# Patient Record
Sex: FEMALE | Employment: UNEMPLOYED | URBAN - METROPOLITAN AREA
[De-identification: names, ages, dates, MRNs, and addresses within clinical notes are randomized per-mention and may not be internally consistent; named-entity substitution may affect disease eponyms.]

---

## 2017-01-05 ENCOUNTER — HOSPITAL ENCOUNTER (OUTPATIENT)
Dept: MRI IMAGING | Facility: CLINIC | Age: 47
Discharge: HOME | End: 2017-01-05
Attending: PAIN MEDICINE | Admitting: PAIN MEDICINE
Payer: COMMERCIAL

## 2017-01-05 ENCOUNTER — TELEPHONE (OUTPATIENT)
Dept: PALLIATIVE MEDICINE | Facility: CLINIC | Age: 47
End: 2017-01-05

## 2017-01-05 DIAGNOSIS — M54.2 CERVICALGIA: ICD-10-CM

## 2017-01-05 DIAGNOSIS — G89.29 CHRONIC LOW BACK PAIN WITHOUT SCIATICA, UNSPECIFIED BACK PAIN LATERALITY: Primary | ICD-10-CM

## 2017-01-05 DIAGNOSIS — M54.50 CHRONIC LOW BACK PAIN WITHOUT SCIATICA, UNSPECIFIED BACK PAIN LATERALITY: Primary | ICD-10-CM

## 2017-01-05 PROCEDURE — 72141 MRI NECK SPINE W/O DYE: CPT
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
## 2017-01-10 NOTE — TELEPHONE ENCOUNTER
"01/05/17 MRI cervical spine completed/reviewed. Per radiology, this demonstrates moderate degenerative changes of the cervical spine, most pronounced at the C5-6 and C6-7 level, with moderate spinal canal narrowing and flattening of the cord. 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     Per [place] , Oxycodone-Acetaminophen 5-325 mg per Dr. Deja Cha, #68 tabs filled on 10/10/16, #56 tabs filed on 10/25/16. Per clinic visit on 11/23/16: \"She reports taking between 2-3 tabs per day. She denies running short on her medications, although states that she doesn't have any medications left right now.\" 11/23/16 urine drug screen was negative for opioids:    ====================================================================   TOXASSURE COMP DRUG ANALYSIS,UR   ====================================================================   Test                             Result       Flag       Units     Drug Present    Amitriptyline                  PRESENT    Nortriptyline                  PRESENT     Nortriptyline may be administered as a prescription drug; it is     also an expected metabolite of amitriptyline.      Bupropion                      PRESENT    Quetiapine                     PRESENT   ====================================================================   Test                      Result    Flag   Units      Ref Range    Creatinine              111              mg/dL      >=20   ====================================================================   Declared Medications:   Medication list was not provided.   ====================================================================   For clinical consultation, please call [phone].   ====================================================================    Of note, subsequent urine drug screen performed by PCP clinic on 12/26/16 was positive for methamphetamine and cannabinoids. Commentary notes: \"Zantac may cause a false positive\" for " methamphetamines. Confirmatory screen on 12/26/16 was negative for cannabinoids. There is no confirmatory screen available for methamphetamines, but this would certainly be recommended in the future to clarify any concerns.     While opioid prescribing is ultimately at the discretion of the patient's primary care provider (who has longer standing history with the patient), would find it difficult to recommend ongoing opioid treatment based on historical inconsistencies with drug screening results. Opioids will not be provided through the pain clinic.     Patient is scheduled for follow up on 01/11/17, at which time we will discuss imaging and lab results, as well as next steps.     CC: Dr. Deja Cha - curtis Conde MD  [facility]

## 2017-01-18 ENCOUNTER — TELEPHONE (OUTPATIENT)
Dept: FAMILY MEDICINE | Facility: CLINIC | Age: 47
End: 2017-01-18

## 2017-01-18 NOTE — TELEPHONE ENCOUNTER
Incoming call from pt requesting pain meds to help manage back pain. Per pt, she has severe back pain.       Thank you,     Carleen Garcia     [facility]

## 2017-01-18 NOTE — TELEPHONE ENCOUNTER
Controlled Substance Refill Request for Percocet    Last refill: 12/26/16, 40 tablets    Last clinic visit: 12/26/16     Next appt:    Controlled substance agreement on file: No.    Documentation in problem list reviewed:      Processing:      [place] reviewed:  reviewed- no concerns  [place]  [email or web address]

## 2017-01-19 ENCOUNTER — TRANSFERRED RECORDS (OUTPATIENT)
Dept: HEALTH INFORMATION MANAGEMENT | Facility: CLINIC | Age: 47
End: 2017-01-19

## 2017-01-19 ENCOUNTER — OFFICE VISIT (OUTPATIENT)
Dept: FAMILY MEDICINE | Facility: CLINIC | Age: 47
End: 2017-01-19
Payer: COMMERCIAL

## 2017-01-19 VITALS
TEMPERATURE: 99.1 F | BODY MASS INDEX: 32.38 KG/M2 | RESPIRATION RATE: 16 BRPM | OXYGEN SATURATION: 97 % | DIASTOLIC BLOOD PRESSURE: 94 MMHG | WEIGHT: 171.5 LBS | SYSTOLIC BLOOD PRESSURE: 123 MMHG | HEART RATE: 105 BPM | HEIGHT: 61 IN

## 2017-01-19 DIAGNOSIS — F43.23 ADJUSTMENT DISORDER WITH MIXED ANXIETY AND DEPRESSED MOOD: ICD-10-CM

## 2017-01-19 DIAGNOSIS — G89.29 CHRONIC RIGHT-SIDED LOW BACK PAIN WITHOUT SCIATICA: ICD-10-CM

## 2017-01-19 DIAGNOSIS — E78.5 HYPERLIPIDEMIA LDL GOAL <160: ICD-10-CM

## 2017-01-19 DIAGNOSIS — M54.50 CHRONIC RIGHT-SIDED LOW BACK PAIN WITHOUT SCIATICA: ICD-10-CM

## 2017-01-19 DIAGNOSIS — G89.29 OTHER CHRONIC PAIN: Primary | ICD-10-CM

## 2017-01-19 LAB
AMPHETAMINES UR QL: ABNORMAL
BARBITURATES UR QL: ABNORMAL
BENZODIAZ UR QL: ABNORMAL
CANNABINOIDS UR QL: ABNORMAL
COCAINE UR QL: ABNORMAL
MDA UR QL SCN: ABNORMAL
METHADONE UR QL SCN: ABNORMAL
METHAMPHET UR QL SCN: ABNORMAL
OPIATES UR QL SCN: ABNORMAL
OXYCODONE UR QL: ABNORMAL
PCP UR QL SCN: ABNORMAL
TRICYCLICS UR QL SCN: ABNORMAL

## 2017-01-19 PROCEDURE — 80305 DRUG TEST PRSMV DIR OPT OBS: CPT | Mod: 59 | Performed by: FAMILY MEDICINE

## 2017-01-19 PROCEDURE — 80307 DRUG TEST PRSMV CHEM ANLYZR: CPT | Mod: 90 | Performed by: FAMILY MEDICINE

## 2017-01-19 PROCEDURE — 99000 SPECIMEN HANDLING OFFICE-LAB: CPT | Performed by: FAMILY MEDICINE

## 2017-01-19 PROCEDURE — 99214 OFFICE O/P EST MOD 30 MIN: CPT | Performed by: FAMILY MEDICINE

## 2017-01-19 RX ORDER — OXYCODONE AND ACETAMINOPHEN 5; 325 MG/1; MG/1
1 TABLET ORAL EVERY 6 HOURS PRN
Qty: 56 TABLET | Refills: 0 | Status: SHIPPED | OUTPATIENT
Start: 2017-01-19 | End: 2017-01-19

## 2017-01-19 NOTE — PATIENT INSTRUCTIONS
Reschedule with pain clinic  No narcotics today per their recommendation   your lumbar back brace  Find someone to go with you to the lumber injection and then we can set that up again  DME for TENS unit    Gene sight testing today     F/u with Dr LOW one month    We reviewed your cervical MRI    Ketoprofen topical ordered at [facility]  [address] [place] [address]  Tel: [phone]  Fax: [phone]  Clinic Hours:  Wednesdays, 6:00 p.m. - 8:00 p.m.  Saturdays, 9:00 a.m. - noon  New patients must go in during these hours and fill out paperwork to set up an intake. They will then schedule appointments.  Chiropractic, acupuncture and [place] medicine, massage therapy, psychology, medicine, nursing, yoga and other therapies. Provided by students under supervision.  Free and open to all.

## 2017-01-19 NOTE — MR AVS SNAPSHOT
After Visit Summary   1/19/2017    Phil Mckoy    MRN: [id number]           Patient Information     Date Of Birth          1970        Visit Information        Provider Department      1/19/2017 4:00 PM Deja Cha MD [facility]        Today's Diagnoses     Other chronic pain    -  1     Chronic right-sided low back pain without sciatica         Hyperlipidemia LDL goal <160         Adjustment disorder with mixed anxiety and depressed mood         BMI 32.0-32.9,adult           Care Instructions    Reschedule with pain clinic  No narcotics today per their recommendation   your lumbar back brace  Find someone to go with you to the lumber injection and then we can set that up again  DME for TENS unit    Gene sight testing today     F/u with Dr LOW one month    We reviewed your cervical MRI    Ketoprofen topical ordered at [facility]  [address] [place] [address]  Tel: [phone]  Fax: [phone]  Clinic Hours:  Wednesdays, 6:00 p.m. - 8:00 p.m.  Saturdays, 9:00 a.m. - noon  New patients must go in during these hours and fill out paperwork to set up an intake. They will then schedule appointments.  Chiropractic, acupuncture and [place] medicine, massage therapy, psychology, medicine, nursing, yoga and other therapies. Provided by students under supervision.  Free and open to all.            Follow-ups after your visit        Your next 10 appointments already scheduled     Feb 27, 2017  1:00 PM   Return Visit with Deja Cha MD   [facility] ([facility])    [address]  [address]  [facility] [address] [phone]              Future tests that were ordered for you today     Open Standing Orders        Priority Remaining Interval Expires Ordered    Cannabinoids quantitative confirm urine Routine 6/6 1/19/2018  "2017            Who to contact     If you have questions or need follow up information about today's clinic visit or your schedule please contact [facility] directly at [phone].  Normal or non-critical lab and imaging results will be communicated to you by MyChart, letter or phone within 4 business days after the clinic has received the results. If you do not hear from us within 7 days, please contact the clinic through MyChart or phone. If you have a critical or abnormal lab result, we will notify you by phone as soon as possible.  Submit refill requests through [employer] or call your pharmacy and they will forward the refill request to us. Please allow 3 business days for your refill to be completed.          Additional Information About Your Visit        [employer] Information     [employer] lets you send messages to your doctor, view your test results, renew your prescriptions, schedule appointments and more. To sign up, go to [email or web address] . Click on \"Log in\" on the left side of the screen, which will take you to the Welcome page. Then click on \"Sign up Now\" on the right side of the page.     You will be asked to enter the access code listed below, as well as some personal information. Please follow the directions to create your username and password.     Your access code is: [email or web address]  Expires: 2017  5:03 PM     Your access code will  in 90 days. If you need help or a new code, please call your [place] clinic or [phone].        Care EveryWhere ID     This is your Care EveryWhere ID. This could be used by other organizations to access your [place] medical records  [id number]        Your Vitals Were     Pulse Temperature Respirations Height BMI (Body Mass Index) Pulse Oximetry    105 99.1  F (37.3  C) (Oral) 16 5' 1\" (1.549 m) 32.42 kg/m2 97%       Blood Pressure from Last 3 Encounters:   17 123/94   16 154/91   16 157/105    " Weight from Last 3 Encounters:   01/19/17 171 lb 8 oz (77.792 kg)   12/26/16 170 lb (77.111 kg)   11/23/16 165 lb (74.844 kg)              We Performed the Following     Drug abuse screen (NL, RW)     Pain Drug Scr UR W Rptd Meds          Today's Medication Changes          These changes are accurate as of: 1/19/17  5:03 PM.  If you have any questions, ask your nurse or doctor.               Start taking these medicines.        Dose/Directions    ketoprofen 10% in PLO 10% topical gel   Used for:  Other chronic pain, Chronic right-sided low back pain without sciatica   Started by:  Deja Cha MD        Dose:  1 g   Apply 1 g topically every 4 hours as needed for moderate pain   Quantity:  30 g   Refills:  3         These medicines have changed or have updated prescriptions.        Dose/Directions    * order for DME   This may have changed:  Another medication with the same name was added. Make sure you understand how and when to take each.   Used for:  Chronic low back pain without sciatica, unspecified back pain laterality   Changed by:  Deja Cha MD        Equipment being ordered: Lumbar back brace   Quantity:  1 Device   Refills:  0       * order for DME   This may have changed:  You were already taking a medication with the same name, and this prescription was added. Make sure you understand how and when to take each.   Used for:  Other chronic pain, Chronic right-sided low back pain without sciatica   Changed by:  Deja Cha MD        Equipment being ordered: TENS   Quantity:  1 Device   Refills:  0       * Notice:  This list has 2 medication(s) that are the same as other medications prescribed for you. Read the directions carefully, and ask your doctor or other care provider to review them with you.      Stop taking these medicines if you haven't already. Please contact your care team if you have questions.     oxyCODONE-acetaminophen 5-325 MG per tablet   Commonly known as:   PERCOCET   Stopped by:  Deja Cha MD                Where to get your medicines      These medications were sent to [facility] [place], [place] - [address]  [address], [facility] [address]     Phone:  [phone]    - ketoprofen 10% in PLO 10% topical gel      Some of these will need a paper prescription and others can be bought over the counter.  Ask your nurse if you have questions.     Bring a paper prescription for each of these medications    - order for DME             Primary Care Provider Office Phone # Fax #    Deja Cha [phone]        [facility] [address]  [facility] [place] [address]        Thank you!     Thank you for choosing [facility]  for your care. Our goal is always to provide you with excellent care. Hearing back from our patients is one way we can continue to improve our services. Please take a few minutes to complete the written survey that you may receive in the mail after your visit with us. Thank you!             Your Updated Medication List - Protect others around you: Learn how to safely use, store and throw away your medicines at www.disposemymeds.org.          This list is accurate as of: 1/19/17  5:03 PM.  Always use your most recent med list.                   Brand Name Dispense Instructions for use    albuterol 108 (90 BASE) MCG/ACT Inhaler    PROAIR HFA/PROVENTIL HFA/VENTOLIN HFA    1 Inhaler    Inhale 2 puffs into the lungs every 6 hours as needed for shortness of breath / dyspnea       amitriptyline 25 MG tablet    ELAVIL    90 tablet    Take 3 tablets (75 mg) by mouth At Bedtime       buPROPion 150 MG 12 hr tablet    WELLBUTRIN SR    180 tablet    Take 1 tablet (150 mg) by mouth 2 times daily       calcium carbonate 500 MG chewable tablet    TUMS    100 tablet    Take 1 tablet (500 mg) by mouth 2 times daily       fexofenadine 180 MG tablet    ALLEGRA    30  tablet    Take 1 tablet (180 mg) by mouth nightly as needed for allergies       fluticasone 50 MCG/ACT spray    FLONASE    16 g    Spray 1-2 sprays into both nostrils daily       ketoprofen 10% in PLO 10% topical gel     30 g    Apply 1 g topically every 4 hours as needed for moderate pain       loratadine 10 MG tablet    CLARITIN         methylcellulose 500 MG Tabs tablet    CITRUCEL    30 each    Take 1 tablet (500 mg) by mouth daily       mometasone-formoterol 100-5 MCG/ACT oral inhaler    DULERA    1 Inhaler    Inhale 2 puffs into the lungs 2 times daily       omeprazole 40 MG capsule    priLOSEC    60 capsule    Take 1 capsule (40 mg) by mouth 2 times daily Take 30-60 minutes before a meal.       * order for DME     1 Device    Equipment being ordered: Lumbar back brace       * order for DME     1 Device    Equipment being ordered: TENS       polyethylene glycol powder    MIRALAX    510 g    Take 17 g (1 capful) by mouth daily       prazosin 2 MG capsule    MINIPRESS    45 capsule    Take 1 capsule (2 mg) by mouth At Bedtime       * QUEtiapine 25 MG tablet    SEROQUEL    30 tablet    Take 0.5 tablets (12.5 mg) by mouth 4 times daily as needed For hallucinations and anger and irritability and anxiety       * QUEtiapine 300 MG tablet    SEROquel    90 tablet    Take 1 tablet (300 mg) by mouth At Bedtime       ranitidine 300 MG tablet    ZANTAC    30 tablet    Take 1 tablet (300 mg) by mouth At Bedtime       SUMAtriptan 100 MG tablet    IMITREX    18 tablet    Take 1 tablet (100 mg) by mouth at onset of headache for migraine May repeat dose in 2 hours.  Do not exceed 200 mg in 24 hours       * Notice:  This list has 4 medication(s) that are the same as other medications prescribed for you. Read the directions carefully, and ask your doctor or other care provider to review them with you.
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
## 2017-01-20 ENCOUNTER — TELEPHONE (OUTPATIENT)
Dept: FAMILY MEDICINE | Facility: CLINIC | Age: 47
End: 2017-01-20

## 2017-01-20 NOTE — TELEPHONE ENCOUNTER
Writer was asked to complete LettuceThinneright testing on Pt.  Bucal swab obtained, tests ordered and sent into [employer].      Neftaly Flores RN

## 2017-01-20 NOTE — PROGRESS NOTES
Spiralcat testing completed.  Writer ordered and sent.  Will provide results to Dr. Cha when obtained.    Neftaly Flores RN

## 2017-01-24 ENCOUNTER — MEDICAL CORRESPONDENCE (OUTPATIENT)
Dept: HEALTH INFORMATION MANAGEMENT | Facility: CLINIC | Age: 47
End: 2017-01-24

## 2017-01-24 LAB — PAIN DRUG SCR UR W RPTD MEDS: NORMAL

## 2017-01-26 ENCOUNTER — TELEPHONE (OUTPATIENT)
Dept: FAMILY MEDICINE | Facility: CLINIC | Age: 47
End: 2017-01-26

## 2017-01-26 ENCOUNTER — OFFICE VISIT (OUTPATIENT)
Dept: FAMILY MEDICINE | Facility: CLINIC | Age: 47
End: 2017-01-26
Payer: COMMERCIAL

## 2017-01-26 DIAGNOSIS — R44.1 HALLUCINATIONS, VISUAL: ICD-10-CM

## 2017-01-26 DIAGNOSIS — F51.5 NIGHTMARES: ICD-10-CM

## 2017-01-26 DIAGNOSIS — F33.1 MAJOR DEPRESSIVE DISORDER, RECURRENT EPISODE, MODERATE (H): Primary | ICD-10-CM

## 2017-01-26 DIAGNOSIS — T30.0 BURN: Primary | ICD-10-CM

## 2017-01-26 PROCEDURE — 99211 OFF/OP EST MAY X REQ PHY/QHP: CPT

## 2017-01-26 NOTE — TELEPHONE ENCOUNTER
I reviewed this and will go over it with the patient at her next appointment. Original in Dr T upright file.Still needs to get copied and scanned to chart and copy sent to patient.  MA can you copy this and place the original back in my file please.

## 2017-01-26 NOTE — TELEPHONE ENCOUNTER
This patients insurance Requires a Prior Authorization for Prazosin 2mg .    Message from Plan Product/service not covered     Plan name [employer]  Contact number [phone]    Member ID [id number]  Pharmacy NPI [id number]  Product NDC [address]-27  Quantity/Day Supply 30/30  RX # [id number]    We would appreciate any updates you may receive as this process develops.    Thank you!    Marjorie Vazquez   Pharmacy Technician     [facility]   [address]  [facility]. [address] [place] [address]. [address]    Retail line: [phone]  Retail Fax: [phone]    Discharge line:[phone]  Discharge Fax: [phone]
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
## 2017-01-27 ENCOUNTER — TELEPHONE (OUTPATIENT)
Dept: FAMILY MEDICINE | Facility: CLINIC | Age: 47
End: 2017-01-27

## 2017-01-27 RX ORDER — PRAZOSIN HYDROCHLORIDE 2 MG/1
2 CAPSULE ORAL AT BEDTIME
Qty: 30 CAPSULE | Refills: 2 | Status: SHIPPED | OUTPATIENT
Start: 2017-01-27 | End: 2018-05-02

## 2017-01-27 NOTE — TELEPHONE ENCOUNTER
Incoming call from pt requesting a letter from PCP stating that she can ride public transportation with her dog.     Carleen Garcia

## 2017-01-27 NOTE — Clinical Note
February 2, 2017      Albertodee MEJIA Ean  [address] [facility] [address] [place] [id number]            To Whom It May Concern,    This patient follows at our clinic for her medical care.     Please allow Phil Mckoy to ride public transportation with her dog.     Sincerely,    Deja Cha MD  Your [facility] Care Team

## 2017-01-27 NOTE — TELEPHONE ENCOUNTER
Original order has Quantity of 45 capsules  With sig - Take one capsule  By mouth at bed time. I spoke with insurance [phone] provider will have to adjust sig to match quantity/day supply  30/30  Prior to initiating PA. Or specify if she would like to keep quantity at 45 capsules per 45 days versus pharmacy's request 30 per 30 days. I will route to pcp to advise.  Marly Agustin MA

## 2017-01-29 ENCOUNTER — HOSPITAL ENCOUNTER (EMERGENCY)
Facility: CLINIC | Age: 47
Discharge: HOME | End: 2017-01-29
Attending: EMERGENCY MEDICINE | Admitting: EMERGENCY MEDICINE
Payer: COMMERCIAL

## 2017-01-29 VITALS
DIASTOLIC BLOOD PRESSURE: 82 MMHG | RESPIRATION RATE: 18 BRPM | WEIGHT: 163 LBS | OXYGEN SATURATION: 100 % | BODY MASS INDEX: 30.81 KG/M2 | TEMPERATURE: 96.6 F | HEART RATE: 64 BPM | SYSTOLIC BLOOD PRESSURE: 136 MMHG

## 2017-01-29 DIAGNOSIS — T22.20XA BURN OF ARM, RIGHT, SECOND DEGREE, INITIAL ENCOUNTER: ICD-10-CM

## 2017-01-29 PROCEDURE — 99282 EMERGENCY DEPT VISIT SF MDM: CPT

## 2017-01-29 PROCEDURE — 99283 EMERGENCY DEPT VISIT LOW MDM: CPT | Mod: Z6 | Performed by: EMERGENCY MEDICINE

## 2017-01-29 ASSESSMENT — ENCOUNTER SYMPTOMS
SHORTNESS OF BREATH: 0
ABDOMINAL PAIN: 0
FEVER: 0
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
## 2017-01-29 NOTE — ED AVS SNAPSHOT
[facility], Emergency Department    [address]    [place] [address]    Phone:  [phone]    Fax:  [phone]                                       Phil Mckoy   MRN: [id number]    Department:  [facility], Emergency Department   Date of Visit:  1/29/2017           After Visit Summary Signature Page     I have received my discharge instructions, and my questions have been answered. I have discussed any challenges I see with this plan with the nurse or doctor.    ..........................................................................................................................................  Patient/Patient Representative Signature      ..........................................................................................................................................  Patient Representative Print Name and Relationship to Patient    ..................................................               ................................................  Date                                            Time    ..........................................................................................................................................  Reviewed by Signature/Title    ...................................................              ..............................................  Date                                                            Time

## 2017-01-30 ENCOUNTER — TELEPHONE (OUTPATIENT)
Dept: FAMILY MEDICINE | Facility: CLINIC | Age: 47
End: 2017-01-30

## 2017-01-30 NOTE — DISCHARGE INSTRUCTIONS
Please make an appointment to follow up with Your Primary Care Provider as soon as possible for a recheck.    Return if spreading redness.    Cover with bacitracin and gauze.
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
## 2017-01-30 NOTE — TELEPHONE ENCOUNTER
PA initiated   [email or web address]     Phil Mckoy (Key: [id number]) - [facility]  Prazosin HCl 2MG capsules  Status: Sent to Plan  Created: January 30th, 2017  Sent: January 30th, 2017  Open  Aakash as not sent  Archive    Marly Agustin MA
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
## 2017-01-30 NOTE — TELEPHONE ENCOUNTER
Writer called pt back and informed that she should continue to manage with OTC cares as discussed in clinic.  Writer reviewed with pt.    Neftaly Flores RN

## 2017-01-30 NOTE — TELEPHONE ENCOUNTER
Will forward request to Dr. Cha.      See triage note from 1/26/17.  Pt was also seen in the ED on 1/29/17.      Will forward to PCP to review and advise on anything further to help with Pt's pain.    Neftaly Flores RN

## 2017-01-31 ENCOUNTER — TELEPHONE (OUTPATIENT)
Dept: FAMILY MEDICINE | Facility: CLINIC | Age: 47
End: 2017-01-31

## 2017-01-31 NOTE — TELEPHONE ENCOUNTER
Alprazolam 0.5mg       Last Written Prescription Date:  6/28/16  Last Fill Quantity: 2,   # refills: 0  Last Office Visit with [facility], [facility] or [facility] prescribing provider: 1/26/17  Future Office visit:    Next 5 appointments (look out 90 days)     Feb 27, 2017  1:00 PM   Return Visit with Deja Cha MD   [facility] ([facility])    [address]  [address]  [place] [address]   [phone]                   Routing refill request to provider for review/approval because:  Drug not on the [facility], [facility] or [facility] refill protocol or controlled substance  Drug not active on patient's medication list    Anamaria Wade PharmD  [facility] - [facility]

## 2017-02-01 NOTE — TELEPHONE ENCOUNTER
Pt has only received xanax in the past for MRI procedures, no upcoming MRI's at this time. Called pharmacy to decline refill.  Leonarda Singh RN

## 2017-02-02 NOTE — TELEPHONE ENCOUNTER
PCP-  Letter pended, unsure of exact phrasing. Please review and advise, sign if appropriate.  Thank you  Leonarda Singh RN

## 2017-02-06 NOTE — TELEPHONE ENCOUNTER
Fax received 02/06/17    Prior Authorization: Approved    Approved as of: 02/06/17 - 02/06/18    Writer placed form in provider's folder    Carleen Garcia February 6, 2017 at 7:54 AM

## 2017-02-08 ENCOUNTER — TELEPHONE (OUTPATIENT)
Dept: FAMILY MEDICINE | Facility: CLINIC | Age: 47
End: 2017-02-08

## 2017-02-08 DIAGNOSIS — M25.562 CHRONIC PAIN OF LEFT KNEE: Primary | ICD-10-CM

## 2017-02-08 DIAGNOSIS — M54.2 CERVICALGIA: ICD-10-CM

## 2017-02-08 DIAGNOSIS — G89.29 CHRONIC PAIN OF LEFT KNEE: Primary | ICD-10-CM

## 2017-02-08 DIAGNOSIS — M54.40 LOW BACK PAIN WITH SCIATICA, SCIATICA LATERALITY UNSPECIFIED, UNSPECIFIED BACK PAIN LATERALITY, UNSPECIFIED CHRONICITY: ICD-10-CM

## 2017-02-08 NOTE — TELEPHONE ENCOUNTER
Pt came in to clinic today asking for a referral to the orthology clinic for PT and chiropractic care, a refill of her prazosin and to folloe up on the medical form faxed to clinic for the TENs unit.   Huddled with Dr LOW who approved referral to orthology. Referral placed and faxed to clinic.   Called [facility] to follow up on TENs unit, per [facility] [place] they received the for 1/24/17 however it was just entered in to their system Monday and was still needing to be reviewed by the documentation team, writer asked for a timeline on reviewal of documentation and was told they would stephanie it as urgent and have it reviewed today.  Called [facility] pharmacy to follow up on minipress refills and was told patient picked up medication today.  Called patient and reviewed above information.  Leonarda Singh RN

## 2017-02-16 ENCOUNTER — TELEPHONE (OUTPATIENT)
Dept: FAMILY MEDICINE | Facility: CLINIC | Age: 47
End: 2017-02-16

## 2017-02-16 ENCOUNTER — OFFICE VISIT (OUTPATIENT)
Dept: FAMILY MEDICINE | Facility: CLINIC | Age: 47
End: 2017-02-16
Payer: COMMERCIAL

## 2017-02-16 VITALS
SYSTOLIC BLOOD PRESSURE: 146 MMHG | BODY MASS INDEX: 33.42 KG/M2 | RESPIRATION RATE: 14 BRPM | HEIGHT: 61 IN | HEART RATE: 95 BPM | DIASTOLIC BLOOD PRESSURE: 84 MMHG | OXYGEN SATURATION: 98 % | TEMPERATURE: 98.7 F | WEIGHT: 177 LBS

## 2017-02-16 DIAGNOSIS — G43.909 MIGRAINE WITHOUT STATUS MIGRAINOSUS, NOT INTRACTABLE, UNSPECIFIED MIGRAINE TYPE: ICD-10-CM

## 2017-02-16 DIAGNOSIS — M54.40 LOW BACK PAIN WITH SCIATICA, SCIATICA LATERALITY UNSPECIFIED, UNSPECIFIED BACK PAIN LATERALITY, UNSPECIFIED CHRONICITY: ICD-10-CM

## 2017-02-16 DIAGNOSIS — K21.9 GASTROESOPHAGEAL REFLUX DISEASE WITHOUT ESOPHAGITIS: ICD-10-CM

## 2017-02-16 DIAGNOSIS — R10.12 ABDOMINAL PAIN, LEFT UPPER QUADRANT: Primary | ICD-10-CM

## 2017-02-16 DIAGNOSIS — M54.50 CHRONIC MIDLINE LOW BACK PAIN WITHOUT SCIATICA: ICD-10-CM

## 2017-02-16 DIAGNOSIS — M62.830 BACK MUSCLE SPASM: ICD-10-CM

## 2017-02-16 DIAGNOSIS — G89.29 CHRONIC MIDLINE LOW BACK PAIN WITHOUT SCIATICA: ICD-10-CM

## 2017-02-16 DIAGNOSIS — G89.4 CHRONIC PAIN SYNDROME: ICD-10-CM

## 2017-02-16 DIAGNOSIS — F33.1 MAJOR DEPRESSIVE DISORDER, RECURRENT EPISODE, MODERATE (H): ICD-10-CM

## 2017-02-16 LAB
ERYTHROCYTE [DISTWIDTH] IN BLOOD BY AUTOMATED COUNT: 12.9 % (ref 10–15)
HCT VFR BLD AUTO: 37.3 % (ref 35–47)
HGB BLD-MCNC: 12.7 G/DL (ref 11.7–15.7)
MCH RBC QN AUTO: 29.3 PG (ref 26.5–33)
MCHC RBC AUTO-ENTMCNC: 34 G/DL (ref 31.5–36.5)
MCV RBC AUTO: 86 FL (ref 78–100)
PLATELET # BLD AUTO: 221 10E9/L (ref 150–450)
RBC # BLD AUTO: 4.34 10E12/L (ref 3.8–5.2)
WBC # BLD AUTO: 8.9 10E9/L (ref 4–11)

## 2017-02-16 PROCEDURE — 85027 COMPLETE CBC AUTOMATED: CPT | Performed by: FAMILY MEDICINE

## 2017-02-16 PROCEDURE — 83690 ASSAY OF LIPASE: CPT | Performed by: FAMILY MEDICINE

## 2017-02-16 PROCEDURE — 82150 ASSAY OF AMYLASE: CPT | Performed by: FAMILY MEDICINE

## 2017-02-16 PROCEDURE — 36415 COLL VENOUS BLD VENIPUNCTURE: CPT | Performed by: FAMILY MEDICINE

## 2017-02-16 PROCEDURE — 99214 OFFICE O/P EST MOD 30 MIN: CPT | Performed by: FAMILY MEDICINE

## 2017-02-16 PROCEDURE — 80053 COMPREHEN METABOLIC PANEL: CPT | Performed by: FAMILY MEDICINE

## 2017-02-16 RX ORDER — METAXALONE 800 MG/1
800 TABLET ORAL 3 TIMES DAILY PRN
Qty: 30 TABLET | Refills: 1 | Status: SHIPPED | OUTPATIENT
Start: 2017-02-16 | End: 2017-03-29
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
## 2017-02-16 NOTE — LETTER
[facility] Primary [facility]  [facility]  [address] [place] [address], [address]  [place]   [address]  Telephone:  [phone]              February 21, 2017      Phil Mckoy  [address]  [place] [address]              Dear Phil,    Results for orders placed or performed in visit on 02/16/17   Amylase   Result Value Ref Range    Amylase 40 30 - 110 U/L   Lipase   Result Value Ref Range    Lipase 179 73 - 393 U/L   CBC with platelets   Result Value Ref Range    WBC 8.9 4.0 - 11.0 10e9/L    RBC Count 4.34 3.8 - 5.2 10e12/L    Hemoglobin 12.7 11.7 - 15.7 g/dL    Hematocrit 37.3 35.0 - 47.0 %    MCV 86 78 - 100 fl    MCH 29.3 26.5 - 33.0 pg    MCHC 34.0 31.5 - 36.5 g/dL    RDW 12.9 10.0 - 15.0 %    Platelet Count 221 150 - 450 10e9/L   Comprehensive metabolic panel   Result Value Ref Range    Sodium 139 133 - 144 mmol/L    Potassium 4.1 3.4 - 5.3 mmol/L    Chloride 106 94 - 109 mmol/L    Carbon Dioxide 24 20 - 32 mmol/L    Anion Gap 9 3 - 14 mmol/L    Glucose 101 (H) 70 - 99 mg/dL    Urea Nitrogen 9 7 - 30 mg/dL    Creatinine 0.92 0.52 - 1.04 mg/dL    GFR Estimate 67 >60 mL/min/1.7m2    GFR Estimate If Black 81 >60 mL/min/1.7m2    Calcium 9.2 8.5 - 10.1 mg/dL    Bilirubin Total 0.3 0.2 - 1.3 mg/dL    Albumin 3.8 3.4 - 5.0 g/dL    Protein Total 7.4 6.8 - 8.8 g/dL    Alkaline Phosphatase 153 (H) 40 - 150 U/L    ALT 63 (H) 0 - 50 U/L    AST 51 (H) 0 - 45 U/L     Mild liver lab elevation  Mild sugar elevation  Labs otherwise were ok          Sincerely,      Deja Cha MD, MEd

## 2017-02-16 NOTE — MR AVS SNAPSHOT
After Visit Summary   2/16/2017    Phil Mckoy    MRN: [id number]           Patient Information     Date Of Birth          1970        Visit Information        Provider Department      2/16/2017 3:30 PM Deja Cha MD [facility] [place]        Today's Diagnoses     Abdominal pain, left upper quadrant    -  1    Chronic midline low back pain without sciatica        Back muscle spasm          Care Instructions    Labs today    Skelaxin muscle relaxant. My require prior authorization.     DME for back brace replacement.    F/u with PT, injection as planned, chiropractor when available.    RN will check up on TENS unit issue.        Follow-ups after your visit        Your next 10 appointments already scheduled     Feb 27, 2017  1:00 PM CST   Return Visit with Deja Cha MD   [facility] [place] [place] ([facility] [place] [place])    [address] [place] [address]  [address]  [facility] [id number] [phone]              Who to contact     If you have questions or need follow up information about today's clinic visit or your schedule please contact [facility] directly at [phone].  Normal or non-critical lab and imaging results will be communicated to you by MyChart, letter or phone within 4 business days after the clinic has received the results. If you do not hear from us within 7 days, please contact the clinic through [employer] or phone. If you have a critical or abnormal lab result, we will notify you by phone as soon as possible.  Submit refill requests through [employer] or call your pharmacy and they will forward the refill request to us. Please allow 3 business days for your refill to be completed.          Additional Information About Your Visit        [employer] Information     [employer] lets you send messages to your doctor, view your test results, renew your prescriptions, schedule appointments and  "more. To sign up, go to [email or web address] . Click on \"Log in\" on the left side of the screen, which will take you to the Welcome page. Then click on \"Sign up Now\" on the right side of the page.     You will be asked to enter the access code listed below, as well as some personal information. Please follow the directions to create your username and password.     Your access code is: [email or web address]  Expires: 2017  5:03 PM     Your access code will  in 90 days. If you need help or a new code, please call your [place] clinic or [phone].        Care EveryWhere ID     This is your Care EveryWhere ID. This could be used by other organizations to access your [facility] [place] medical records  [id number]        Your Vitals Were     Pulse Temperature Respirations Height Pulse Oximetry BMI (Body Mass Index)    95 98.7  F (37.1  C) (Oral) 14 5' 1\" (1.549 m) 98% 33.44 kg/m2       Blood Pressure from Last 3 Encounters:   17 146/84   17 136/82   17 (!) 123/94    Weight from Last 3 Encounters:   17 177 lb (80.3 kg)   17 163 lb (73.9 kg)   17 171 lb 8 oz (77.8 kg)              We Performed the Following     Amylase     CBC with platelets     Comprehensive metabolic panel     Lipase          Today's Medication Changes          These changes are accurate as of: 17  4:01 PM.  If you have any questions, ask your nurse or doctor.               Start taking these medicines.        Dose/Directions    metaxalone 800 MG tablet   Commonly known as:  SKELAXIN   Used for:  Back muscle spasm, Chronic midline low back pain without sciatica   Started by:  Deja Cha MD        Dose:  800 mg   Take 1 tablet (800 mg) by mouth 3 times daily as needed for moderate pain   Quantity:  30 tablet   Refills:  1         These medicines have changed or have updated prescriptions.        Dose/Directions    * order for DME   This may have changed:  Another medication with the same name was added. " Make sure you understand how and when to take each.   Used for:  Other chronic pain, Chronic right-sided low back pain without sciatica   Changed by:  Deja Cha MD        Equipment being ordered: TENS   Quantity:  1 Device   Refills:  0       * order for DME   This may have changed:  You were already taking a medication with the same name, and this prescription was added. Make sure you understand how and when to take each.   Used for:  Chronic midline low back pain without sciatica   Changed by:  Deja Cha MD        Equipment being ordered: Back brace   Quantity:  1 Device   Refills:  0       * Notice:  This list has 2 medication(s) that are the same as other medications prescribed for you. Read the directions carefully, and ask your doctor or other care provider to review them with you.         Where to get your medicines      These medications were sent to [facility] [place], [place] - [address]  [address], [facility] [address]     Phone:  [phone]     metaxalone 800 MG tablet         Some of these will need a paper prescription and others can be bought over the counter.  Ask your nurse if you have questions.     Bring a paper prescription for each of these medications     order for DME                Primary Care Provider Office Phone # Fax #    Deja Cha [phone]       [facility] [address]  [facility] [address]        Thank you!     Thank you for choosing [facility]  for your care. Our goal is always to provide you with excellent care. Hearing back from our patients is one way we can continue to improve our services. Please take a few minutes to complete the written survey that you may receive in the mail after your visit with us. Thank you!             Your Updated Medication List - Protect others around you: Learn how to safely use, store and throw away your  medicines at www.disposemymeds.org.          This list is accurate as of: 2/16/17  4:01 PM.  Always use your most recent med list.                   Brand Name Dispense Instructions for use    albuterol 108 (90 BASE) MCG/ACT Inhaler    PROAIR HFA/PROVENTIL HFA/VENTOLIN HFA    1 Inhaler    Inhale 2 puffs into the lungs every 6 hours as needed for shortness of breath / dyspnea       amitriptyline 25 MG tablet    ELAVIL    90 tablet    Take 3 tablets (75 mg) by mouth At Bedtime       buPROPion 150 MG 12 hr tablet    WELLBUTRIN SR    180 tablet    Take 1 tablet (150 mg) by mouth 2 times daily       calcium carbonate 500 MG chewable tablet    TUMS    100 tablet    Take 1 tablet (500 mg) by mouth 2 times daily       fluticasone 50 MCG/ACT spray    FLONASE    16 g    Spray 1-2 sprays into both nostrils daily       ketoprofen 10% in PLO 10% topical gel     30 g    Apply 1 g topically every 4 hours as needed for moderate pain       metaxalone 800 MG tablet    SKELAXIN    30 tablet    Take 1 tablet (800 mg) by mouth 3 times daily as needed for moderate pain       methylcellulose 500 MG Tabs tablet    CITRUCEL    30 each    Take 1 tablet (500 mg) by mouth daily       mometasone-formoterol 100-5 MCG/ACT oral inhaler    DULERA    1 Inhaler    Inhale 2 puffs into the lungs 2 times daily       omeprazole 40 MG capsule    priLOSEC    60 capsule    Take 1 capsule (40 mg) by mouth 2 times daily Take 30-60 minutes before a meal.       * order for DME     1 Device    Equipment being ordered: TENS       * order for DME     1 Device    Equipment being ordered: Back brace       polyethylene glycol powder    MIRALAX    510 g    Take 17 g (1 capful) by mouth daily       prazosin 2 MG capsule    MINIPRESS    30 capsule    Take 1 capsule (2 mg) by mouth At Bedtime       * QUEtiapine 25 MG tablet    SEROQUEL    30 tablet    Take 0.5 tablets (12.5 mg) by mouth 4 times daily as needed For hallucinations and anger and irritability and anxiety        * QUEtiapine 300 MG tablet    SEROquel    90 tablet    Take 1 tablet (300 mg) by mouth At Bedtime       ranitidine 300 MG tablet    ZANTAC    30 tablet    Take 1 tablet (300 mg) by mouth At Bedtime       SUMAtriptan 100 MG tablet    IMITREX    18 tablet    Take 1 tablet (100 mg) by mouth at onset of headache for migraine May repeat dose in 2 hours.  Do not exceed 200 mg in 24 hours       * Notice:  This list has 4 medication(s) that are the same as other medications prescribed for you. Read the directions carefully, and ask your doctor or other care provider to review them with you.

## 2017-02-16 NOTE — NURSING NOTE
"Chief Complaint   Patient presents with     Lipids       Initial /84 (BP Location: Left arm, Patient Position: Chair, Cuff Size: Adult Large)  Pulse 95  Temp 98.7  F (37.1  C) (Oral)  Resp 14  Ht 5' 1\" (1.549 m)  Wt 177 lb (80.3 kg)  SpO2 98%  BMI 33.44 kg/m2 Estimated body mass index is 33.44 kg/(m^2) as calculated from the following:    Height as of this encounter: 5' 1\" (1.549 m).    Weight as of this encounter: 177 lb (80.3 kg).  Medication Reconciliation: complete       Alaina Montoya MA      "

## 2017-02-16 NOTE — TELEPHONE ENCOUNTER
This patients insurance Requires a Prior Authorization for Metaxalone 800mg .    Message from Plan NON-FORMULARY DRUG, CONTACT PRESCRIBER    Plan name [employer]  Contact number [phone]    Member ID [id number]  Pharmacy NPI [id number]  Product NDC [id number]  Quantity/Day Supply 30/10  RX # [id number]    We would appreciate any updates you may receive as this process develops.    Thank you!    Marjorie Vazquez   Pharmacy Technician     [facility]   [address]  [address] [address]    Retail line: [phone]  Retail Fax: [phone]    Discharge line:[phone]  Discharge Fax: [phone]

## 2017-02-16 NOTE — TELEPHONE ENCOUNTER
Please call patients insurance this plan will not except priors via cover my meds  Please contact pa dept directly at [phone] to verbally approve or to have pa form faxed  per orders this plan.      Rachel Frederick, [facility]  [phone]

## 2017-02-16 NOTE — PATIENT INSTRUCTIONS
Labs today    Skelaxin muscle relaxant. My require prior authorization.     DME for back brace replacement.    F/u with PT, injection as planned, chiropractor when available.    RN will check up on TENS unit issue.

## 2017-02-17 LAB
ALBUMIN SERPL-MCNC: 3.8 G/DL (ref 3.4–5)
ALP SERPL-CCNC: 153 U/L (ref 40–150)
ALT SERPL W P-5'-P-CCNC: 63 U/L (ref 0–50)
AMYLASE SERPL-CCNC: 40 U/L (ref 30–110)
ANION GAP SERPL CALCULATED.3IONS-SCNC: 9 MMOL/L (ref 3–14)
AST SERPL W P-5'-P-CCNC: 51 U/L (ref 0–45)
BILIRUB SERPL-MCNC: 0.3 MG/DL (ref 0.2–1.3)
BUN SERPL-MCNC: 9 MG/DL (ref 7–30)
CALCIUM SERPL-MCNC: 9.2 MG/DL (ref 8.5–10.1)
CHLORIDE SERPL-SCNC: 106 MMOL/L (ref 94–109)
CO2 SERPL-SCNC: 24 MMOL/L (ref 20–32)
CREAT SERPL-MCNC: 0.92 MG/DL (ref 0.52–1.04)
GFR SERPL CREATININE-BSD FRML MDRD: 67 ML/MIN/1.7M2
GLUCOSE SERPL-MCNC: 101 MG/DL (ref 70–99)
LIPASE SERPL-CCNC: 179 U/L (ref 73–393)
POTASSIUM SERPL-SCNC: 4.1 MMOL/L (ref 3.4–5.3)
PROT SERPL-MCNC: 7.4 G/DL (ref 6.8–8.8)
SODIUM SERPL-SCNC: 139 MMOL/L (ref 133–144)

## 2017-02-17 NOTE — TELEPHONE ENCOUNTER
[facility] [employer] [place] outcome : Metaxalone approved 02/16/2017-02/16/2018    Reference for approval [id number]   Additional Questions call [phone]  Paperwork placed in abstract    Marly Agustin MA

## 2017-02-17 NOTE — TELEPHONE ENCOUNTER
Fax received 02/17/17    Prior Authorization: Approved    Approved as of: 02/16/17 - 02/16/18    Writer placed form in provider's folder    Carleen Garcia February 17, 2017 at 7:42 AM

## 2017-03-03 ENCOUNTER — OFFICE VISIT (OUTPATIENT)
Dept: FAMILY MEDICINE | Facility: CLINIC | Age: 47
End: 2017-03-03

## 2017-03-03 VITALS
RESPIRATION RATE: 18 BRPM | SYSTOLIC BLOOD PRESSURE: 117 MMHG | WEIGHT: 174 LBS | BODY MASS INDEX: 32.88 KG/M2 | OXYGEN SATURATION: 96 % | TEMPERATURE: 98.1 F | DIASTOLIC BLOOD PRESSURE: 81 MMHG | HEART RATE: 102 BPM

## 2017-03-03 DIAGNOSIS — J06.9 VIRAL UPPER RESPIRATORY TRACT INFECTION WITH COUGH: Primary | ICD-10-CM

## 2017-03-03 RX ORDER — OXYMETAZOLINE HYDROCHLORIDE 0.05 G/100ML
2-3 SPRAY NASAL 2 TIMES DAILY PRN
Qty: 1 BOTTLE | Refills: 0 | Status: SHIPPED | OUTPATIENT
Start: 2017-03-03 | End: 2018-05-02

## 2017-03-03 RX ORDER — CODEINE PHOSPHATE AND GUAIFENESIN 10; 100 MG/5ML; MG/5ML
1 SOLUTION ORAL EVERY 4 HOURS PRN
Qty: 120 ML | Refills: 0 | Status: SHIPPED | OUTPATIENT
Start: 2017-03-03 | End: 2017-03-29

## 2017-03-03 NOTE — MR AVS SNAPSHOT
"              After Visit Summary   3/3/2017    Phil Mckoy    MRN: [id number]           Patient Information     Date Of Birth          1970        Visit Information        Provider Department      3/3/2017 2:00 PM Dagoberto Chua PA-C [facility]        Today's Diagnoses     Viral upper respiratory tract infection with cough    -  1       Follow-ups after your visit        Your next 10 appointments already scheduled     Mar 21, 2017  2:00 PM CDT   Return Visit with Deja Cha MD   [facility] ([facility])    [address] [facility]  [address]  [facility] [id number] [phone]              Who to contact     If you have questions or need follow up information about today's clinic visit or your schedule please contact [facility] [place] [place] directly at [phone].  Normal or non-critical lab and imaging results will be communicated to you by MyChart, letter or phone within 4 business days after the clinic has received the results. If you do not hear from us within 7 days, please contact the clinic through MyChart or phone. If you have a critical or abnormal lab result, we will notify you by phone as soon as possible.  Submit refill requests through [employer] or call your pharmacy and they will forward the refill request to us. Please allow 3 business days for your refill to be completed.          Additional Information About Your Visit        MyChart Information     [employer] [facility] [facility] lets you send messages to your doctor, view your test results, renew your prescriptions, schedule appointments and more. To sign up, go to [email or web address] . Click on \"Log in\" on the left side of the screen, which will take you to the Welcome page. Then click on \"Sign up Now\" on the right side of the page.     You will be asked to enter the access code listed below, as well as some personal information. " Please follow the directions to create your username and password.     Your access code is: [email or web address]  Expires: 2017  5:03 PM     Your access code will  in 90 days. If you need help or a new code, please call your [place] clinic or [phone].        Care EveryWhere ID     This is your Care EveryWhere ID. This could be used by other organizations to access your [place] medical records  [id number]        Your Vitals Were     Pulse Temperature Respirations Pulse Oximetry BMI (Body Mass Index)       102 98.1  F (36.7  C) (Oral) 18 96% 32.88 kg/m2        Blood Pressure from Last 3 Encounters:   17 117/81   17 146/84   17 136/82    Weight from Last 3 Encounters:   17 174 lb (78.9 kg)   17 177 lb (80.3 kg)   17 163 lb (73.9 kg)              Today, you had the following     No orders found for display         Today's Medication Changes          These changes are accurate as of: 3/3/17 11:59 PM.  If you have any questions, ask your nurse or doctor.               Start taking these medicines.        Dose/Directions    guaiFENesin-codeine 100-10 MG/5ML Soln solution   Commonly known as:  ROBITUSSIN AC   Used for:  Viral upper respiratory tract infection with cough   Started by:  Dagoberto Chua PA-C        Dose:  1 tsp.   Take 5 mLs by mouth every 4 hours as needed for cough   Quantity:  120 mL   Refills:  0       oxymetazoline 0.05 % spray   Commonly known as:  AFRIN NASAL SPRAY   Used for:  Viral upper respiratory tract infection with cough   Started by:  Dagoberto Chua PA-C        Dose:  2-3 spray   Spray 2-3 sprays into both nostrils 2 times daily as needed for congestion   Quantity:  1 Bottle   Refills:  0            Where to get your medicines      These medications were sent to [facility] [address]  [address] [address]     Phone:  [phone]     oxymetazoline 0.05 % spray          Some of these will need a paper prescription and others can be bought over the counter.  Ask your nurse if you have questions.     Bring a paper prescription for each of these medications     guaiFENesin-codeine 100-10 MG/5ML Soln solution                Primary Care Provider Office Phone # Fax #    Deja Cha [phone]       [facility] [address] [place] [address]  [facility] [address]        Thank you!     Thank you for choosing [facility]  for your care. Our goal is always to provide you with excellent care. Hearing back from our patients is one way we can continue to improve our services. Please take a few minutes to complete the written survey that you may receive in the mail after your visit with us. Thank you!             Your Updated Medication List - Protect others around you: Learn how to safely use, store and throw away your medicines at www.disposemymeds.org.          This list is accurate as of: 3/3/17 11:59 PM.  Always use your most recent med list.                   Brand Name Dispense Instructions for use    albuterol 108 (90 BASE) MCG/ACT Inhaler    PROAIR HFA/PROVENTIL HFA/VENTOLIN HFA    1 Inhaler    Inhale 2 puffs into the lungs every 6 hours as needed for shortness of breath / dyspnea       amitriptyline 25 MG tablet    ELAVIL    90 tablet    Take 3 tablets (75 mg) by mouth At Bedtime       buPROPion 150 MG 12 hr tablet    WELLBUTRIN SR    180 tablet    Take 1 tablet (150 mg) by mouth 2 times daily       calcium carbonate 500 MG chewable tablet    TUMS    100 tablet    Take 1 tablet (500 mg) by mouth 2 times daily       fluticasone 50 MCG/ACT spray    FLONASE    16 g    Spray 1-2 sprays into both nostrils daily       guaiFENesin-codeine 100-10 MG/5ML Soln solution    ROBITUSSIN AC    120 mL    Take 5 mLs by mouth every 4 hours as needed for cough       ketoprofen 10% in PLO 10% topical gel     30 g    Apply 1 g topically every 4  hours as needed for moderate pain       metaxalone 800 MG tablet    SKELAXIN    30 tablet    Take 1 tablet (800 mg) by mouth 3 times daily as needed for moderate pain       methylcellulose 500 MG Tabs tablet    CITRUCEL    30 each    Take 1 tablet (500 mg) by mouth daily       mometasone-formoterol 100-5 MCG/ACT oral inhaler    DULERA    1 Inhaler    Inhale 2 puffs into the lungs 2 times daily       omeprazole 40 MG capsule    priLOSEC    60 capsule    Take 1 capsule (40 mg) by mouth 2 times daily Take 30-60 minutes before a meal.       * order for DME     1 Device    Equipment being ordered: TENS       * order for DME     1 Device    Equipment being ordered: Back brace       oxymetazoline 0.05 % spray    AFRIN NASAL SPRAY    1 Bottle    Spray 2-3 sprays into both nostrils 2 times daily as needed for congestion       polyethylene glycol powder    MIRALAX    510 g    Take 17 g (1 capful) by mouth daily       prazosin 2 MG capsule    MINIPRESS    30 capsule    Take 1 capsule (2 mg) by mouth At Bedtime       * QUEtiapine 25 MG tablet    SEROQUEL    30 tablet    Take 0.5 tablets (12.5 mg) by mouth 4 times daily as needed For hallucinations and anger and irritability and anxiety       * QUEtiapine 300 MG tablet    SEROquel    90 tablet    Take 1 tablet (300 mg) by mouth At Bedtime       ranitidine 300 MG tablet    ZANTAC    30 tablet    Take 1 tablet (300 mg) by mouth At Bedtime       SUMAtriptan 100 MG tablet    IMITREX    18 tablet    Take 1 tablet (100 mg) by mouth at onset of headache for migraine May repeat dose in 2 hours.  Do not exceed 200 mg in 24 hours       * Notice:  This list has 4 medication(s) that are the same as other medications prescribed for you. Read the directions carefully, and ask your doctor or other care provider to review them with you.

## 2017-03-03 NOTE — NURSING NOTE
"Chief Complaint   Patient presents with     URI     cold/cough       Initial /81 (BP Location: Left arm, Patient Position: Chair, Cuff Size: Adult Small)  Pulse 102  Temp 98.1  F (36.7  C) (Oral)  Resp 18  Wt 174 lb (78.9 kg)  SpO2 96%  BMI 32.88 kg/m2 Estimated body mass index is 32.88 kg/(m^2) as calculated from the following:    Height as of 2/16/17: 5' 1\" (1.549 m).    Weight as of this encounter: 174 lb (78.9 kg).  Medication Reconciliation: complete     Merary Negro MA      "

## 2017-03-03 NOTE — PROGRESS NOTES
SUBJECTIVE:                                                    Phil Mckoy is a 46 year old female who presents to clinic today for the following health issues:    RESPIRATORY SYMPTOMS      Duration: x 5 days    Description  nasal congestion, rhinorrhea, sore throat, facial pain/pressure, cough, conjunctival irritation and stomach ache    Severity: symptoms getting worse    Accompanying signs and symptoms: see descriptions    History (predisposing factors):  Yes, worse last year    Precipitating or alleviating factors: None    Therapies tried and outcome:  Nyquil and nasal spray         Problem list and histories reviewed & adjusted, as indicated.  Additional history: No sick contacts.     Reviewed and updated as needed this visit by clinical staff       Reviewed and updated as needed this visit by Provider         ROS:  Constitutional, HEENT, cardiovascular, pulmonary, gi and gu systems are negative, except as otherwise noted.    OBJECTIVE:                                                    /81 (BP Location: Left arm, Patient Position: Chair, Cuff Size: Adult Small)  Pulse 102  Temp 98.1  F (36.7  C) (Oral)  Resp 18  Wt 174 lb (78.9 kg)  SpO2 96%  BMI 32.88 kg/m2  Body mass index is 32.88 kg/(m^2).  GEN: Well developed, well nourished in NAD.  HEENT: Normocephalic. Eyes: + conjunctival flushing noted. EARS: TMs WNL, Canals clear.  Nose: Edematous mucosa without lesion. Clear rhinorrhea. Mouth/Pharynx: sl cobblestoning and telangiectasia. + Percussed Maxillary Sinus tenderness.  NECK: Supple with no anterior cervical lymphadenopathy.  No Thyromegaly  RESP: CTA with good air entry all fields.  SKIN: No Exanthem noted.      Diagnostic Test Results:  none      ASSESSMENT/PLAN:                                                    1. Viral upper respiratory tract infection with cough  - guaiFENesin-codeine (ROBITUSSIN AC) 100-10 MG/5ML SOLN solution; Take 5 mLs by mouth every 4 hours as needed for cough   Dispense: 120 mL; Refill: 0  - oxymetazoline (AFRIN NASAL SPRAY) 0.05 % spray; Spray 2-3 sprays into both nostrils 2 times daily as needed for congestion  Dispense: 1 Bottle; Refill: 0    Use medication as directed.  Continue supportive OTC measures.  Follow up if symptoms should persist, change or worsen.  Patient amenable to this follow up plan.     Dagoberto Chua PA-C  [facility] PR[facility]ARE

## 2017-03-10 ENCOUNTER — TELEPHONE (OUTPATIENT)
Dept: FAMILY MEDICINE | Facility: CLINIC | Age: 47
End: 2017-03-10

## 2017-03-10 NOTE — TELEPHONE ENCOUNTER
Doxycycline 100mg      Last Written Prescription Date:  1/21/16  Last Fill Quantity: 14,   # refills: 0  Last Office Visit with [facility], [facility] or  Health prescribing provider: 3/3/17  Future Office visit:    Next 5 appointments (look out 90 days)     Mar 21, 2017  2:00 PM CDT   Return Visit with Deja Cha MD   [facility] ([facility])    [address]  [address]  [facility] [address]   [phone]                   Routing refill request to provider for review/approval because:  Drug not active on patient's medication list    Clindamycin 150mg  Last Written Prescription Date:  10/13/16  Last Fill Quantity: 28,   # refills: 0  Last Office Visit with [facility], [facility] or [facility] prescribing provider: 3/3/17  Future Office visit:    Next 5 appointments (look out 90 days)     Mar 21, 2017  2:00 PM CDT   Return Visit with Deja Cha MD   [facility] ([facility])    [address]  [address]  [facility] [address]   [phone]                   Routing refill request to provider for review/approval because:  Drug not active on patient's medication list    Anamaria Wade, Latrell  [facility]

## 2017-03-14 ENCOUNTER — TELEPHONE (OUTPATIENT)
Dept: FAMILY MEDICINE | Facility: CLINIC | Age: 47
End: 2017-03-14

## 2017-03-14 NOTE — TELEPHONE ENCOUNTER
Called patient to follow up, states she does not need these and doesn't take them anymore. Called and informed pharmacy that these medications are no longer active and not being taken by patient.  Leonarda Singh RN

## 2017-03-14 NOTE — TELEPHONE ENCOUNTER
Returned patients call, added note to next weeks appointment in regards to patient requesting lab work. Left VM informing her and that if she has any symptoms or questions to call back to discuss.  Leonarda Singh RN

## 2017-03-14 NOTE — TELEPHONE ENCOUNTER
Incoming call from pt requesting orders for a test to check for menopause.     Carleen Garcia

## 2017-03-17 ENCOUNTER — TELEPHONE (OUTPATIENT)
Dept: FAMILY MEDICINE | Facility: CLINIC | Age: 47
End: 2017-03-17

## 2017-03-17 DIAGNOSIS — G43.009 NONINTRACTABLE MIGRAINE, UNSPECIFIED MIGRAINE TYPE: ICD-10-CM

## 2017-03-17 NOTE — TELEPHONE ENCOUNTER
AMITRIPTYLINE 25mg      Last Written Prescription Date: 11/27/2016  Last Fill Quantity: 90, # refills: 2  Last Office Visit with [facility], [facility] or [facility] prescribing provider: 2/16/2017  Next 5 appointments (look out 90 days)     Mar 21, 2017  2:00 PM CDT   Return Visit with Deja Cha MD   [facility]    [address]  [address]  [place] [address] [phone]                   BP Readings from Last 3 Encounters:   03/03/17 117/81   02/16/17 146/84   01/29/17 136/82     Anirudh Jama, Pharm.D.  [facility]  [phone]

## 2017-03-17 NOTE — TELEPHONE ENCOUNTER
Incoming call from pt, she's requesting something for her back writer is unsure of exactly what pt needs, as she was not clear.  Pt contact info:  [phone]        Anirudh Syed

## 2017-03-18 ENCOUNTER — HOSPITAL ENCOUNTER (EMERGENCY)
Facility: CLINIC | Age: 47
Discharge: HOME | End: 2017-03-18
Attending: EMERGENCY MEDICINE | Admitting: EMERGENCY MEDICINE
Payer: COMMERCIAL

## 2017-03-18 VITALS
SYSTOLIC BLOOD PRESSURE: 121 MMHG | OXYGEN SATURATION: 99 % | HEART RATE: 94 BPM | RESPIRATION RATE: 18 BRPM | TEMPERATURE: 98.3 F | DIASTOLIC BLOOD PRESSURE: 75 MMHG

## 2017-03-18 DIAGNOSIS — S89.91XA INJURY OF LOWER EXTREMITY, RIGHT, INITIAL ENCOUNTER: ICD-10-CM
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
## 2017-03-18 PROCEDURE — 99283 EMERGENCY DEPT VISIT LOW MDM: CPT | Performed by: EMERGENCY MEDICINE

## 2017-03-18 PROCEDURE — 99284 EMERGENCY DEPT VISIT MOD MDM: CPT | Mod: Z6 | Performed by: EMERGENCY MEDICINE

## 2017-03-18 RX ORDER — IBUPROFEN 200 MG
400 TABLET ORAL EVERY 6 HOURS PRN
Qty: 60 TABLET | Refills: 0 | Status: SHIPPED | OUTPATIENT
Start: 2017-03-18 | End: 2017-03-22

## 2017-03-18 RX ORDER — HYDROCODONE BITARTRATE AND ACETAMINOPHEN 5; 325 MG/1; MG/1
1-2 TABLET ORAL EVERY 6 HOURS PRN
Qty: 16 TABLET | Refills: 0 | Status: SHIPPED | OUTPATIENT
Start: 2017-03-18 | End: 2017-03-22

## 2017-03-18 ASSESSMENT — ENCOUNTER SYMPTOMS: ARTHRALGIAS: 1

## 2017-03-18 NOTE — ED AVS SNAPSHOT
[facility], Emergency Department    [address]    [facility] [place] [place] [address]    Phone:  [phone]    Fax:  [phone]                                       Phil Mckoy   MRN: [id number]    Department:  [facility], Emergency Department   Date of Visit:  3/18/2017           After Visit Summary Signature Page     I have received my discharge instructions, and my questions have been answered. I have discussed any challenges I see with this plan with the nurse or doctor.    ..........................................................................................................................................  Patient/Patient Representative Signature      ..........................................................................................................................................  Patient Representative Print Name and Relationship to Patient    ..................................................               ................................................  Date                                            Time    ..........................................................................................................................................  Reviewed by Signature/Title    ...................................................              ..............................................  Date                                                            Time

## 2017-03-18 NOTE — ED NOTES
Patient presents with right hip and knee burning pain and verbalized she fell yesterday and landed on right side; patient verbalized numbness and tingling in right ankle; patient states she took muscle relaxant this AM but is unsure of name of medication.

## 2017-03-18 NOTE — ED AVS SNAPSHOT
[facility], Emergency Department    [address]    [place] [address]    Phone:  [phone]    Fax:  [phone]                                       Phil Mckoy   MRN: [id number]    Department:  [facility], Emergency Department   Date of Visit:  3/18/2017           Patient Information     Date Of Birth          1970        Your diagnoses for this visit were:     Injury of lower extremity, right, initial encounter        You were seen by Halie Jackman MD.        Discharge Instructions       You can take vicodin for pain.  Do not take with tylenol.      You can also take ibuprofen for pain.      If you are not feeling better after 4-5 days, please follow up with your primary care doctor for recheck.     Future Appointments        Provider Department Dept Phone Center    3/29/2017 10:00 AM Deja Cha MD [facility] [phone] [facility]      24 Hour Appointment Hotline       To make an appointment at any [facility], call [phone] ([phone]). If you don't have a family doctor or clinic, we will help you find one. [facility] are conveniently located to serve the needs of you and your family.             Review of your medicines      START taking        Dose / Directions Last dose taken    HYDROcodone-acetaminophen 5-325 MG per tablet   Commonly known as:  NORCO   Dose:  1-2 tablet   Quantity:  16 tablet        Take 1-2 tablets by mouth every 6 hours as needed for pain   Refills:  0        ibuprofen 200 MG tablet   Commonly known as:  ADVIL/MOTRIN   Dose:  400 mg   Quantity:  60 tablet        Take 2 tablets (400 mg) by mouth every 6 hours as needed for mild pain   Refills:  0          Our records show that you are taking the medicines listed below. If these are incorrect, please call your family doctor or clinic.        Dose / Directions Last dose taken    albuterol 108 (90 BASE) MCG/ACT Inhaler   Commonly known as:  PROAIR  HFA/PROVENTIL HFA/VENTOLIN HFA   Dose:  2 puff   Quantity:  1 Inhaler        Inhale 2 puffs into the lungs every 6 hours as needed for shortness of breath / dyspnea   Refills:  1        amitriptyline 25 MG tablet   Commonly known as:  ELAVIL   Dose:  75 mg   Quantity:  90 tablet        Take 3 tablets (75 mg) by mouth At Bedtime   Refills:  2        buPROPion 150 MG 12 hr tablet   Commonly known as:  WELLBUTRIN SR   Dose:  150 mg   Quantity:  180 tablet        Take 1 tablet (150 mg) by mouth 2 times daily   Refills:  1        calcium carbonate 500 MG chewable tablet   Commonly known as:  TUMS   Dose:  1 chew tab   Quantity:  100 tablet        Take 1 tablet (500 mg) by mouth 2 times daily   Refills:  3        fluticasone 50 MCG/ACT spray   Commonly known as:  FLONASE   Dose:  1-2 spray   Quantity:  16 g        Spray 1-2 sprays into both nostrils daily   Refills:  2        guaiFENesin-codeine 100-10 MG/5ML Soln solution   Commonly known as:  ROBITUSSIN AC   Dose:  1 tsp.   Quantity:  120 mL        Take 5 mLs by mouth every 4 hours as needed for cough   Refills:  0        ketoprofen 10% in PLO 10% topical gel   Dose:  1 g   Quantity:  30 g        Apply 1 g topically every 4 hours as needed for moderate pain   Refills:  3        metaxalone 800 MG tablet   Commonly known as:  SKELAXIN   Dose:  800 mg   Quantity:  30 tablet        Take 1 tablet (800 mg) by mouth 3 times daily as needed for moderate pain   Refills:  1        methylcellulose 500 MG Tabs tablet   Commonly known as:  CITRUCEL   Dose:  500 mg   Quantity:  30 each        Take 1 tablet (500 mg) by mouth daily   Refills:  3        mometasone-formoterol 100-5 MCG/ACT oral inhaler   Commonly known as:  DULERA   Dose:  2 puff   Quantity:  1 Inhaler        Inhale 2 puffs into the lungs 2 times daily   Refills:  3        omeprazole 40 MG capsule   Commonly known as:  priLOSEC   Dose:  40 mg   Quantity:  60 capsule        Take 1 capsule (40 mg) by mouth 2 times daily  Take 30-60 minutes before a meal.   Refills:  1        * order for DME   Quantity:  1 Device        Equipment being ordered: TENS   Refills:  0        * order for DME   Quantity:  1 Device        Equipment being ordered: Back brace   Refills:  0        oxymetazoline 0.05 % spray   Commonly known as:  AFRIN NASAL SPRAY   Dose:  2-3 spray   Quantity:  1 Bottle        Spray 2-3 sprays into both nostrils 2 times daily as needed for congestion   Refills:  0        polyethylene glycol powder   Commonly known as:  MIRALAX   Dose:  1 capful   Quantity:  510 g        Take 17 g (1 capful) by mouth daily   Refills:  11        prazosin 2 MG capsule   Commonly known as:  MINIPRESS   Dose:  2 mg   Quantity:  30 capsule        Take 1 capsule (2 mg) by mouth At Bedtime   Refills:  2        * QUEtiapine 25 MG tablet   Commonly known as:  SEROQUEL   Dose:  12.5 mg   Quantity:  30 tablet        Take 0.5 tablets (12.5 mg) by mouth 4 times daily as needed For hallucinations and anger and irritability and anxiety   Refills:  1        * QUEtiapine 300 MG tablet   Commonly known as:  SEROquel   Dose:  300 mg   Quantity:  90 tablet        Take 1 tablet (300 mg) by mouth At Bedtime   Refills:  1        ranitidine 300 MG tablet   Commonly known as:  ZANTAC   Dose:  300 mg   Quantity:  30 tablet        Take 1 tablet (300 mg) by mouth At Bedtime   Refills:  3        SUMAtriptan 100 MG tablet   Commonly known as:  IMITREX   Dose:  100 mg   Quantity:  18 tablet        Take 1 tablet (100 mg) by mouth at onset of headache for migraine May repeat dose in 2 hours.  Do not exceed 200 mg in 24 hours   Refills:  3        * Notice:  This list has 4 medication(s) that are the same as other medications prescribed for you. Read the directions carefully, and ask your doctor or other care provider to review them with you.            Prescriptions were sent or printed at these locations (2 Prescriptions)                   Other Prescriptions                 "Printed at Department/Unit printer (2 of 2)         HYDROcodone-acetaminophen (NORCO) 5-325 MG per tablet               ibuprofen (ADVIL/MOTRIN) 200 MG tablet                Orders Needing Specimen Collection     None      Pending Results     No orders found from 3/16/2017 to 3/19/2017.            Pending Culture Results     No orders found from 3/16/2017 to 3/19/2017.            Thank you for choosing [facility]       Thank you for choosing [facility] for your care. Our goal is always to provide you with excellent care. Hearing back from our patients is one way we can continue to improve our services. Please take a few minutes to complete the written survey that you may receive in the mail after you visit with us. Thank you!        [facility] Information     [facility] lets you send messages to your doctor, view your test results, renew your prescriptions, schedule appointments and more. To sign up, go to [email or web address] . Click on \"Log in\" on the left side of the screen, which will take you to the Welcome page. Then click on \"Sign up Now\" on the right side of the page.     You will be asked to enter the access code listed below, as well as some personal information. Please follow the directions to create your username and password.     Your access code is: [email or web address]  Expires: 2017  6:03 PM     Your access code will  in 90 days. If you need help or a new code, please call your [facility] clinic or [phone].        Care EveryWhere ID     This is your Care EveryWhere ID. This could be used by other organizations to access your [facility] medical records  [id number]        After Visit Summary       This is your record. Keep this with you and show to your community pharmacist(s) and doctor(s) at your next visit.                  "

## 2017-03-18 NOTE — DISCHARGE INSTRUCTIONS
You can take vicodin for pain.  Do not take with tylenol.      You can also take ibuprofen for pain.      If you are not feeling better after 4-5 days, please follow up with your primary care doctor for recheck.

## 2017-03-18 NOTE — ED PROVIDER NOTES
History     Chief Complaint   Patient presents with     Fall     Pt fell on ice yesterday & now having burning from R hip towards R knee.      HPI  Phil Mckoy is a 46 year old female who presents for right leg pain.  She states she was walking outside yesterday and then next thing she knew she had fallen, slipping on ice.  She is not sure how she landed.  She noticed right leg after the fall.  She has pain the entire leg.  She can walk.  She is s/p hysterectomy.     I have reviewed the Medications, Allergies, Past Medical and Surgical History, and Social History in the Epic system.    Review of Systems   Musculoskeletal: Positive for arthralgias.   All other systems reviewed and are negative.      Physical Exam   BP: 131/63  Pulse: 94  Temp: 98.7  F (37.1  C)  Resp: 16  SpO2: 98 %  Physical Exam   Constitutional: She is oriented to person, place, and time. She appears well-developed and well-nourished. No distress.   HENT:   Head: Normocephalic and atraumatic.   Right Ear: External ear normal.   Left Ear: External ear normal.   Nose: Nose normal.   Eyes: EOM are normal. Pupils are equal, round, and reactive to light.   Neck: Normal range of motion. Neck supple.   Non tender   Cardiovascular: Normal rate, regular rhythm and normal heart sounds.    Pulmonary/Chest: Effort normal and breath sounds normal.   Abdominal: Soft. Bowel sounds are normal. There is no tenderness.   Musculoskeletal:   The patient walks with minimal limp.  She has mild pain over right knee.  Full rom of hip.  Full rom of knee.  No swelling, bruising or deformity.  Pulses intact.     Neurological: She is alert and oriented to person, place, and time.   Skin: Skin is warm and dry. No rash noted. She is not diaphoretic. No erythema. No pallor.   Psychiatric: Her mood appears anxious.   Nursing note and vitals reviewed.      ED Course     ED Course     Procedures           Labs Ordered and Resulted from Time of ED Arrival Up to the Time of  Departure from the ED - No data to display    Assessments & Plan (with Medical Decision Making)   Based on the patient's exam I do not feel xrays were indicated.  She has improved rom when distracted.  She can bear weight. No swelling or bruising.  She will be d/c home with pain relief for right leg injury - contusions and strain only.     I have reviewed the nursing notes.    I have reviewed the findings, diagnosis, plan and need for follow up with the patient.    New Prescriptions    HYDROCODONE-ACETAMINOPHEN (NORCO) 5-325 MG PER TABLET    Take 1-2 tablets by mouth every 6 hours as needed for pain    IBUPROFEN (ADVIL/MOTRIN) 200 MG TABLET    Take 2 tablets (400 mg) by mouth every 6 hours as needed for mild pain       Final diagnoses:   Injury of lower extremity, right, initial encounter       3/18/2017   [facility], [place], EMERGENCY DEPARTMENT     Halie Jackman MD  03/18/17 [id number]

## 2017-03-20 NOTE — TELEPHONE ENCOUNTER
LVM. Instructed patient to call clinic back at earliest convenience to address patient needs.     Thanks! Hansa Bartlett RN

## 2017-03-20 NOTE — TELEPHONE ENCOUNTER
Patient called to find out when her appt. With Dr. Cha was scheduled. Writer communicated the information to patient. Patient verbalizes understanding. Patient states pain meds managing pain from recent fall on ice. Patient instructed to ice painful area, Patient in agreement with plan and verbalizes understanding.   Thanks!   Hansa Bartlett RN
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
## 2017-03-29 ENCOUNTER — OFFICE VISIT (OUTPATIENT)
Dept: BEHAVIORAL HEALTH | Facility: CLINIC | Age: 47
End: 2017-03-29
Payer: COMMERCIAL

## 2017-03-29 ENCOUNTER — OFFICE VISIT (OUTPATIENT)
Dept: FAMILY MEDICINE | Facility: CLINIC | Age: 47
End: 2017-03-29
Payer: COMMERCIAL

## 2017-03-29 VITALS
RESPIRATION RATE: 16 BRPM | SYSTOLIC BLOOD PRESSURE: 122 MMHG | HEIGHT: 61 IN | DIASTOLIC BLOOD PRESSURE: 81 MMHG | WEIGHT: 173 LBS | HEART RATE: 87 BPM | BODY MASS INDEX: 32.66 KG/M2 | OXYGEN SATURATION: 99 % | TEMPERATURE: 98.6 F

## 2017-03-29 DIAGNOSIS — M62.830 BACK MUSCLE SPASM: ICD-10-CM

## 2017-03-29 DIAGNOSIS — G89.29 CHRONIC PAIN OF RIGHT KNEE: Primary | ICD-10-CM

## 2017-03-29 DIAGNOSIS — M54.50 CHRONIC MIDLINE LOW BACK PAIN WITHOUT SCIATICA: ICD-10-CM

## 2017-03-29 DIAGNOSIS — G89.29 CHRONIC MIDLINE LOW BACK PAIN WITHOUT SCIATICA: ICD-10-CM

## 2017-03-29 DIAGNOSIS — F41.9 ANXIETY: Primary | ICD-10-CM

## 2017-03-29 DIAGNOSIS — M25.561 CHRONIC PAIN OF RIGHT KNEE: Primary | ICD-10-CM

## 2017-03-29 DIAGNOSIS — J45.20 INTERMITTENT ASTHMA, UNCOMPLICATED: ICD-10-CM

## 2017-03-29 DIAGNOSIS — F79 INTELLECTUAL FUNCTIONING DISABILITY: ICD-10-CM

## 2017-03-29 DIAGNOSIS — F33.1 MAJOR DEPRESSIVE DISORDER, RECURRENT EPISODE, MODERATE (H): ICD-10-CM

## 2017-03-29 PROCEDURE — 99215 OFFICE O/P EST HI 40 MIN: CPT | Performed by: FAMILY MEDICINE

## 2017-03-29 PROCEDURE — 99207 ZZC NO CHARGE BEHAVIORAL WARM HANDOFF: CPT | Performed by: SOCIAL WORKER

## 2017-03-29 RX ORDER — ALPRAZOLAM 0.5 MG
0.5 TABLET ORAL 3 TIMES DAILY PRN
Qty: 2 TABLET | Refills: 0 | Status: SHIPPED | OUTPATIENT
Start: 2017-03-29 | End: 2017-07-24

## 2017-03-29 RX ORDER — METAXALONE 800 MG/1
800 TABLET ORAL 3 TIMES DAILY PRN
Qty: 30 TABLET | Refills: 5 | Status: SHIPPED | OUTPATIENT
Start: 2017-03-29 | End: 2017-05-05 | Stop reason: ALTCHOICE

## 2017-03-29 NOTE — MR AVS SNAPSHOT
After Visit Summary   3/29/2017    Phil Mckoy    MRN: [id number]           Patient Information     Date Of Birth          1970        Visit Information        Provider Department      3/29/2017 10:00 AM Deja Cha MD [facility]        Today's Diagnoses     Chronic pain of right knee    -  1    Back muscle spasm        Chronic midline low back pain without sciatica        Intermittent asthma, uncomplicated        Major depressive disorder, recurrent episode, moderate (H)        Intellectual functioning disability          Care Instructions    MRI right knee  Xanax for study  Ortho consult    Continue other cares    F/u with Dr LOW one month    Refill done            Follow-ups after your visit        Additional Services     ORTHO  REFERRAL       [facility] is referring you to the Orthopedic  Services at [facility].       The  Representative will assist you in the coordination of your Orthopedic and Musculoskeletal Care as prescribed by your physician.    The  Representative will call you within 1 business day to help schedule your appointment, or you may contact the  Representative at:    All areas ~ [phone]     Type of Referral : Non Surgical       Timeframe requested: Routine    Coverage of these services is subject to the terms and limitations of your health insurance plan.  Please call member services at your health plan with any benefit or coverage questions.      If X-rays, CT or MRI's have been performed, please contact the facility where they were done to arrange for , prior to your scheduled appointment.  Please bring this referral request to your appointment and present it to your specialist.                  Follow-up notes from your care team     Return in about 4 weeks (around 4/26/2017).      Future tests that were ordered for you today      "Open Future Orders        Priority Expected Expires Ordered    MR Knee Right w/o Contrast Routine  3/29/2018 3/29/2017            Who to contact     If you have questions or need follow up information about today's clinic visit or your schedule please contact [facility] directly at [phone].  Normal or non-critical lab and imaging results will be communicated to you by MyChart, letter or phone within 4 business days after the clinic has received the results. If you do not hear from us within 7 days, please contact the clinic through [employer] or phone. If you have a critical or abnormal lab result, we will notify you by phone as soon as possible.  Submit refill requests through [employer] or call your pharmacy and they will forward the refill request to us. Please allow 3 business days for your refill to be completed.          Additional Information About Your Visit        MyChart Information     [employer] lets you send messages to your doctor, view your test results, renew your prescriptions, schedule appointments and more. To sign up, go to [email or web address] . Click on \"Log in\" on the left side of the screen, which will take you to the Welcome page. Then click on \"Sign up Now\" on the right side of the page.     You will be asked to enter the access code listed below, as well as some personal information. Please follow the directions to create your username and password.     Your access code is: [email or web address]  Expires: 2017  6:03 PM     Your access code will  in 90 days. If you need help or a new code, please call your [facility] clinic or [phone].        Care EveryWhere ID     This is your Care EveryWhere ID. This could be used by other organizations to access your [facility] medical records  [id number]        Your Vitals Were     Pulse Temperature Respirations Height Pulse Oximetry BMI (Body Mass Index)    88 98.6  F (37  C) (Oral) 16 5' 1\" (1.549 m) 99% 32.69 kg/m2 "       Blood Pressure from Last 3 Encounters:   03/29/17 (!) 135/103   03/18/17 121/75   03/03/17 117/81    Weight from Last 3 Encounters:   03/29/17 173 lb (78.5 kg)   03/03/17 174 lb (78.9 kg)   02/16/17 177 lb (80.3 kg)              We Performed the Following     DEPRESSION ACTION PLAN (DAP)     ORTHO  REFERRAL          Today's Medication Changes          These changes are accurate as of: 3/29/17 11:12 AM.  If you have any questions, ask your nurse or doctor.               Start taking these medicines.        Dose/Directions    ALPRAZolam 0.5 MG tablet   Commonly known as:  XANAX   Used for:  Chronic pain of right knee   Started by:  Deja Cha MD        Dose:  0.5 mg   Take 1 tablet (0.5 mg) by mouth 3 times daily as needed for anxiety   Quantity:  2 tablet   Refills:  0            Where to get your medicines      These medications were sent to [facility] [address]  [address] [facility] [address], [facility] [address]     Phone:  [phone]     metaxalone 800 MG tablet         Some of these will need a paper prescription and others can be bought over the counter.  Ask your nurse if you have questions.     Bring a paper prescription for each of these medications     ALPRAZolam 0.5 MG tablet                Primary Care Provider Office Phone # Fax #    Deja Cha [phone]       [facility] [address] [facility] [address]  [facility] [address]        Thank you!     Thank you for choosing [facility]  for your care. Our goal is always to provide you with excellent care. Hearing back from our patients is one way we can continue to improve our services. Please take a few minutes to complete the written survey that you may receive in the mail after your visit with us. Thank you!             Your Updated Medication List - Protect others around you: Learn how to safely use, store and throw away your medicines  at www.disposemymeds.org.          This list is accurate as of: 3/29/17 11:12 AM.  Always use your most recent med list.                   Brand Name Dispense Instructions for use    albuterol 108 (90 BASE) MCG/ACT Inhaler    PROAIR HFA/PROVENTIL HFA/VENTOLIN HFA    1 Inhaler    Inhale 2 puffs into the lungs every 6 hours as needed for shortness of breath / dyspnea       ALPRAZolam 0.5 MG tablet    XANAX    2 tablet    Take 1 tablet (0.5 mg) by mouth 3 times daily as needed for anxiety       amitriptyline 25 MG tablet    ELAVIL    90 tablet    Take 3 tablets (75 mg) by mouth At Bedtime       buPROPion 150 MG 12 hr tablet    WELLBUTRIN SR    180 tablet    Take 1 tablet (150 mg) by mouth 2 times daily       calcium carbonate 500 MG chewable tablet    TUMS    100 tablet    Take 1 tablet (500 mg) by mouth 2 times daily       fluticasone 50 MCG/ACT spray    FLONASE    16 g    Spray 1-2 sprays into both nostrils daily       ketoprofen 10% in PLO 10% topical gel     30 g    Apply 1 g topically every 4 hours as needed for moderate pain       metaxalone 800 MG tablet    SKELAXIN    30 tablet    Take 1 tablet (800 mg) by mouth 3 times daily as needed for moderate pain       methylcellulose 500 MG Tabs tablet    CITRUCEL    30 each    Take 1 tablet (500 mg) by mouth daily       mometasone-formoterol 100-5 MCG/ACT oral inhaler    DULERA    1 Inhaler    Inhale 2 puffs into the lungs 2 times daily       omeprazole 40 MG capsule    priLOSEC    60 capsule    Take 1 capsule (40 mg) by mouth 2 times daily Take 30-60 minutes before a meal.       * order for DME     1 Device    Equipment being ordered: TENS       * order for DME     1 Device    Equipment being ordered: Back brace       oxymetazoline 0.05 % spray    AFRIN NASAL SPRAY    1 Bottle    Spray 2-3 sprays into both nostrils 2 times daily as needed for congestion       polyethylene glycol powder    MIRALAX    510 g    Take 17 g (1 capful) by mouth daily       prazosin 2 MG  capsule    MINIPRESS    30 capsule    Take 1 capsule (2 mg) by mouth At Bedtime       * QUEtiapine 25 MG tablet    SEROQUEL    30 tablet    Take 0.5 tablets (12.5 mg) by mouth 4 times daily as needed For hallucinations and anger and irritability and anxiety       * QUEtiapine 300 MG tablet    SEROquel    90 tablet    Take 1 tablet (300 mg) by mouth At Bedtime       ranitidine 300 MG tablet    ZANTAC    30 tablet    Take 1 tablet (300 mg) by mouth At Bedtime       SUMAtriptan 100 MG tablet    IMITREX    18 tablet    Take 1 tablet (100 mg) by mouth at onset of headache for migraine May repeat dose in 2 hours.  Do not exceed 200 mg in 24 hours       * Notice:  This list has 4 medication(s) that are the same as other medications prescribed for you. Read the directions carefully, and ask your doctor or other care provider to review them with you.

## 2017-03-29 NOTE — NURSING NOTE
"Chief Complaint   Patient presents with     Knee Pain     Right knee pain      Consult     Menopause        Initial BP (!) 135/103  Pulse 88  Temp 98.6  F (37  C) (Oral)  Resp 16  Ht 5' 1\" (1.549 m)  Wt 173 lb (78.5 kg)  SpO2 99%  BMI 32.69 kg/m2 Estimated body mass index is 32.69 kg/(m^2) as calculated from the following:    Height as of this encounter: 5' 1\" (1.549 m).    Weight as of this encounter: 173 lb (78.5 kg).  Medication Reconciliation: complete   Marly Agustin MA      "

## 2017-03-29 NOTE — PROGRESS NOTES
Met with patient briefly to assess her mental health. Pt was guarded in her responses but denied any anxiety, depression, mame, panic attacks or OCD. She stated that she feels like her medications are helping, therefore her mental health is stable. Pt stated that she is going to see a therapist starting in May. Pt reported that she is going to [place] for her dad's wedding for about three weeks in April. Pt stated that she feels like she is getting everything she needs regarding her mental health. Writer let pt know that she can make an appointment with writer whenever she needs if she feels like she needs additional mental health support. This was not a billable service due to length of time.

## 2017-03-29 NOTE — MR AVS SNAPSHOT
"              After Visit Summary   3/29/2017    Phil Mckoy    MRN: [id number]           Patient Information     Date Of Birth          1970        Visit Information        Provider Department      3/29/2017 10:00 AM Dana Garay [facility]        Today's Diagnoses     Anxiety    -  1       Follow-ups after your visit        Future tests that were ordered for you today     Open Future Orders        Priority Expected Expires Ordered    MR Knee Right w/o Contrast Routine  3/29/2018 3/29/2017            Who to contact     If you have questions or need follow up information about today's clinic visit or your schedule please contact [facility] PRIMARY [facility] directly at [phone].  Normal or non-critical lab and imaging results will be communicated to you by MyChart, letter or phone within 4 business days after the clinic has received the results. If you do not hear from us within 7 days, please contact the clinic through MyChart or phone. If you have a critical or abnormal lab result, we will notify you by phone as soon as possible.  Submit refill requests through "[employer] or call your pharmacy and they will forward the refill request to us. Please allow 3 business days for your refill to be completed.          Additional Information About Your Visit        MyChart Information     "[employer] lets you send messages to your doctor, view your test results, renew your prescriptions, schedule appointments and more. To sign up, go to [email or web address] . Click on \"Log in\" on the left side of the screen, which will take you to the Welcome page. Then click on \"Sign up Now\" on the right side of the page.     You will be asked to enter the access code listed below, as well as some personal information. Please follow the directions to create your username and password.     Your access code is: [email or web address]  Expires: 2017  6:03 PM     Your access code will  " in 90 days. If you need help or a new code, please call your [facility] clinic or [phone].        Care EveryWhere ID     This is your Care EveryWhere ID. This could be used by other organizations to access your [facility] medical records  [id number]         Blood Pressure from Last 3 Encounters:   03/29/17 122/81   03/18/17 121/75   03/03/17 117/81    Weight from Last 3 Encounters:   03/29/17 173 lb (78.5 kg)   03/03/17 174 lb (78.9 kg)   02/16/17 177 lb (80.3 kg)              Today, you had the following     No orders found for display         Today's Medication Changes          These changes are accurate as of: 3/29/17 11:27 AM.  If you have any questions, ask your nurse or doctor.               Start taking these medicines.        Dose/Directions    ALPRAZolam 0.5 MG tablet   Commonly known as:  XANAX   Used for:  Chronic pain of right knee   Started by:  Deja Cha MD        Dose:  0.5 mg   Take 1 tablet (0.5 mg) by mouth 3 times daily as needed for anxiety   Quantity:  2 tablet   Refills:  0            Where to get your medicines      These medications were sent to [facility] [place], [place] - [address]  [address], [facility] [address]     Phone:  [phone]     metaxalone 800 MG tablet         Some of these will need a paper prescription and others can be bought over the counter.  Ask your nurse if you have questions.     Bring a paper prescription for each of these medications     ALPRAZolam 0.5 MG tablet                Primary Care Provider Office Phone # Fax #    Deja Cha [phone]       [facility] CARE [facility] [address]  [facility] [address]        Thank you!     Thank you for choosing [facility]ARE  for your care. Our goal is always to provide you with excellent care. Hearing back from our patients is one way we can continue to improve our services. Please take a few minutes to  complete the written survey that you may receive in the mail after your visit with us. Thank you!             Your Updated Medication List - Protect others around you: Learn how to safely use, store and throw away your medicines at www.disposemymeds.org.          This list is accurate as of: 3/29/17 11:27 AM.  Always use your most recent med list.                   Brand Name Dispense Instructions for use    albuterol 108 (90 BASE) MCG/ACT Inhaler    PROAIR HFA/PROVENTIL HFA/VENTOLIN HFA    1 Inhaler    Inhale 2 puffs into the lungs every 6 hours as needed for shortness of breath / dyspnea       ALPRAZolam 0.5 MG tablet    XANAX    2 tablet    Take 1 tablet (0.5 mg) by mouth 3 times daily as needed for anxiety       amitriptyline 25 MG tablet    ELAVIL    90 tablet    Take 3 tablets (75 mg) by mouth At Bedtime       buPROPion 150 MG 12 hr tablet    WELLBUTRIN SR    180 tablet    Take 1 tablet (150 mg) by mouth 2 times daily       calcium carbonate 500 MG chewable tablet    TUMS    100 tablet    Take 1 tablet (500 mg) by mouth 2 times daily       fluticasone 50 MCG/ACT spray    FLONASE    16 g    Spray 1-2 sprays into both nostrils daily       ketoprofen 10% in PLO 10% topical gel     30 g    Apply 1 g topically every 4 hours as needed for moderate pain       metaxalone 800 MG tablet    SKELAXIN    30 tablet    Take 1 tablet (800 mg) by mouth 3 times daily as needed for moderate pain       methylcellulose 500 MG Tabs tablet    CITRUCEL    30 each    Take 1 tablet (500 mg) by mouth daily       mometasone-formoterol 100-5 MCG/ACT oral inhaler    DULERA    1 Inhaler    Inhale 2 puffs into the lungs 2 times daily       omeprazole 40 MG capsule    priLOSEC    60 capsule    Take 1 capsule (40 mg) by mouth 2 times daily Take 30-60 minutes before a meal.       * order for DME     1 Device    Equipment being ordered: TENS       * order for DME     1 Device    Equipment being ordered: Back brace       oxymetazoline 0.05 %  spray    AFRIN NASAL SPRAY    1 Bottle    Spray 2-3 sprays into both nostrils 2 times daily as needed for congestion       polyethylene glycol powder    MIRALAX    510 g    Take 17 g (1 capful) by mouth daily       prazosin 2 MG capsule    MINIPRESS    30 capsule    Take 1 capsule (2 mg) by mouth At Bedtime       * QUEtiapine 25 MG tablet    SEROQUEL    30 tablet    Take 0.5 tablets (12.5 mg) by mouth 4 times daily as needed For hallucinations and anger and irritability and anxiety       * QUEtiapine 300 MG tablet    SEROquel    90 tablet    Take 1 tablet (300 mg) by mouth At Bedtime       ranitidine 300 MG tablet    ZANTAC    30 tablet    Take 1 tablet (300 mg) by mouth At Bedtime       SUMAtriptan 100 MG tablet    IMITREX    18 tablet    Take 1 tablet (100 mg) by mouth at onset of headache for migraine May repeat dose in 2 hours.  Do not exceed 200 mg in 24 hours       * Notice:  This list has 4 medication(s) that are the same as other medications prescribed for you. Read the directions carefully, and ask your doctor or other care provider to review them with you.

## 2017-03-29 NOTE — PATIENT INSTRUCTIONS
MRI right knee  Xanax for study  Ortho consult    Continue other cares    F/u with Dr LOW one month    Refill done

## 2017-04-17 ENCOUNTER — TELEPHONE (OUTPATIENT)
Dept: FAMILY MEDICINE | Facility: CLINIC | Age: 47
End: 2017-04-17

## 2017-04-17 ENCOUNTER — OFFICE VISIT (OUTPATIENT)
Dept: FAMILY MEDICINE | Facility: CLINIC | Age: 47
End: 2017-04-17
Payer: COMMERCIAL

## 2017-04-17 VITALS — TEMPERATURE: 98.9 F

## 2017-04-17 DIAGNOSIS — F43.23 ADJUSTMENT DISORDER WITH MIXED ANXIETY AND DEPRESSED MOOD: ICD-10-CM

## 2017-04-17 DIAGNOSIS — R82.90 NONSPECIFIC FINDING ON EXAMINATION OF URINE: Primary | ICD-10-CM

## 2017-04-17 DIAGNOSIS — F33.1 MAJOR DEPRESSIVE DISORDER, RECURRENT EPISODE, MODERATE (H): ICD-10-CM

## 2017-04-17 DIAGNOSIS — N89.8 VAGINAL DISCHARGE: Primary | ICD-10-CM

## 2017-04-17 DIAGNOSIS — F41.9 ANXIETY: ICD-10-CM

## 2017-04-17 DIAGNOSIS — N89.8 VAGINAL DISCHARGE: ICD-10-CM

## 2017-04-17 LAB
ALBUMIN UR-MCNC: 100 MG/DL
APPEARANCE UR: CLEAR
BACTERIA #/AREA URNS HPF: ABNORMAL /HPF
BILIRUB UR QL STRIP: NEGATIVE
COLOR UR AUTO: YELLOW
GLUCOSE UR STRIP-MCNC: NEGATIVE MG/DL
HGB UR QL STRIP: ABNORMAL
KETONES UR STRIP-MCNC: NEGATIVE MG/DL
LEUKOCYTE ESTERASE UR QL STRIP: ABNORMAL
NITRATE UR QL: NEGATIVE
NON-SQ EPI CELLS #/AREA URNS LPF: ABNORMAL /LPF
PH UR STRIP: 5 PH (ref 5–7)
RBC #/AREA URNS AUTO: ABNORMAL /HPF (ref 0–2)
SP GR UR STRIP: >1.03 (ref 1–1.03)
URN SPEC COLLECT METH UR: ABNORMAL
UROBILINOGEN UR STRIP-ACNC: 0.2 EU/DL (ref 0.2–1)
WBC #/AREA URNS AUTO: ABNORMAL /HPF (ref 0–2)

## 2017-04-17 PROCEDURE — 99207 ZZC NO CHARGE NURSE ONLY: CPT

## 2017-04-17 PROCEDURE — 81001 URINALYSIS AUTO W/SCOPE: CPT | Performed by: FAMILY MEDICINE

## 2017-04-17 PROCEDURE — 87086 URINE CULTURE/COLONY COUNT: CPT | Performed by: FAMILY MEDICINE

## 2017-04-17 NOTE — TELEPHONE ENCOUNTER
Wellbutrin SR 150mg 12HTablet       Last Written Prescription Date: 12/26/2016  Last Fill Quantity: 180; # refills: 1  Last Office Visit with [facility] prescribing provider:  03/29/2017   Next 5 appointments (look out 90 days)     May 04, 2017  4:00 PM CDT   Return Visit with Deja Cha MD   [facility] ([facility])    [address]  [address]  [facility] [address]   [phone]                   Last PHQ-9 score on record=   PHQ-9 SCORE 12/26/2016   Total Score 0       Lab Results   Component Value Date    AST 51 02/16/2017     Lab Results   Component Value Date    ALT 63 02/16/2017         Ibuprofen 400mg      Last Written Prescription Date: 3/18/2017  Last Quantity: 60, # refills: 0  Last Office Visit with [facility] or [facility] prescribing provider: 03/29/2017  Next 5 appointments (look out 90 days)     May 04, 2017  4:00 PM CDT   Return Visit with Deja Cha MD   [facility] ([facility])    [address]  [address]  [facility] [address]   [phone]                   Creatinine   Date Value Ref Range Status   02/16/2017 0.92 0.52 - 1.04 mg/dL Final     Lab Results   Component Value Date    AST 51 02/16/2017     Lab Results   Component Value Date    ALT 63 02/16/2017     BP Readings from Last 3 Encounters:   03/29/17 122/81   03/18/17 121/75   03/03/17 117/81     **Ibuprofen was written by an ER MD and not currently on med list. Routing to provider for further evaluation.    Anirudh Jama, Pharm.D.  [facility]  [phone]

## 2017-04-17 NOTE — NURSING NOTE
"Phil Mckoy is a 46 year old female who presents with symptoms \"overactive bladder & vaginal liquid\".    NURSING ASSESSMENT:  Description:  Patient walked into clinic requesting office visit for \"overractive bladder\"  Onset/duration:  1.5 weeks  Precip. factors:  unknown  Associated symptoms:    1. Urinary Frequency - during day and with nocturia - episodes of incontinence  2. Vaginal Discharge thin consistency - clear - she changes her symptoms description later and denies urine incontinence and states its vaginal fluid  3. Afebrile  4. Denies pain/burning with urination, itching, redness, abdominal pain, flank pain, rash, new sex partners or unprotected sex, odor  5. Patient drank 2 cups of coffee or more while waiting - but denies coffee or alcohol use    Improves/worsens symptoms:  Day/night  Pain scale (0-10)   0/10  LMP/preg/breast feeding:  N/A  Last exam/Treatment:  3/29/2017  Allergies:   Allergies   Allergen Reactions     Compazine      Unsure of rxn     Erythromycin Swelling     Gabapentin      Patient reported tremor and ??seizure like activity     Penicillins Swelling     Sulfa Drugs Hives       MEDICATIONS:   Taking medication(s) as prescribed? N/A  Taking over the counter medication(s?) unknown  Any medication side effects? No significant side effects    Any barriers to taking medication(s) as prescribed?  No  Medication(s) improving/managing symptoms?  N/A  Medication reconciliation completed: No      NURSING PLAN: Huddle with provider, plan includes Semaj - verbal order for UAC and Wet Prep and Nursing advice to patient writer attempted to discuss home care - patient continued to interrupt writer and provide reasons as to why she didn't need to complete home care attempted to discuss - keigals, absorband pads, brief, incontinence products for bed, avoid caffeine/alcohol, drink less prior to bed time - patient was agreeable to labs - pharmacy [facility] please note writer " discussed with patient we are not a walk in clinic - appointments are needed for new symptoms - attempted to schedule patient within 24 hours and she refused - encouraged patient to call and discuss symptoms and schedule appointment in the future    RECOMMENDED DISPOSITION:  Home care advice - and labs ordered  Will comply with recommendation: Yes  If further questions/concerns or if symptoms do not improve, worsen or new symptoms develop, call your PCP or [facility] as soon as possible.      Guideline used:  Telephone Triage Protocols for Nurses, Fifth Edition, Emiliana Brannon RN

## 2017-04-17 NOTE — TELEPHONE ENCOUNTER
Patient walked into clinic is requesting refill for xanax for MRI - writer advised patient she has refill on file and available down at  [facility]    Closing encounter - no further actions needed at this time    Tenzin Brannon RN

## 2017-04-17 NOTE — MR AVS SNAPSHOT
After Visit Summary   4/17/2017    Phil Mckoy    MRN: [id number]           Patient Information     Date Of Birth          1970        Visit Information        Provider Department      4/17/2017 12:30 PM [facility] FLOAT NURSE [facility]        Today's Diagnoses     Vaginal discharge    -  1       Follow-ups after your visit        Your next 10 appointments already scheduled     May 01, 2017  2:30 PM CDT   (Arrive by 2:15 PM)   New Patient Visit with Reuben Varner MD   [facility] ([facility])    [address]  5th Floor  [facility] [id number] [phone]            May 01, 2017  4:00 PM CDT   (Arrive by 3:45 PM)   MR KNEE RIGHT W/O CONTRAST with [id number]   [facility] MRI ([facility])    [address] [place] [address]  [address] [facility] [id number] [phone]           Take your medicines as usual, unless your doctor tells you not to. Bring a list of your current medicines to your exam (including vitamins, minerals and over-the-counter drugs). Also bring the results of similar scans you may have had.  Please remove any body piercings and hair extensions before you arrive.  Follow your doctor s orders. If you do not, we may have to postpone your exam.  You will not have contrast for this exam. You do not need to do anything special to prepare.  The MRI machine uses a strong magnet. Please wear clothes without metal (snaps, zippers). A sweatsuit works well, or we may give you a hospital gown.   **IMPORTANT** THE INSTRUCTIONS BELOW ARE ONLY FOR THOSE PATIENTS WHO HAVE BEEN TOLD THEY WILL RECEIVE SEDATION OR GENERAL ANESTHESIA DURING THEIR MRI PROCEDURE:  IF YOU WILL RECEIVE SEDATION (take medicine to help you relax during your exam):   You must get the medicine from your doctor before you arrive. Bring the medicine to the exam. Do not take it at home.   Arrive one hour  early. Bring someone who can take you home after the test. Your medicine will make you sleepy. After the exam, you may not drive, take a bus or take a taxi by yourself.   No eating 8 hours before your exam. You may have clear liquids up until 4 hours before your exam. (Clear liquids include water, clear tea, black coffee and fruit juice without pulp.)  IF YOU WILL RECEIVE ANESTHESIA (be asleep for your exam):   Arrive 1 1/2 hours early. Bring someone who can take you home after the test. You may not drive, take a bus or take a taxi by yourself.   No eating 8 hours before your exam. You may have clear liquids up until 4 hours before your exam. (Clear liquids include water, clear tea, black coffee and fruit juice without pulp.)   You will spend four to five hours in the recovery room.  Please call the Imaging Department at your exam site with any questions.            May 04, 2017  4:00 PM CDT   Return Visit with Deja Cha MD   [facility] [place] [facility] [place]    [address] [place] [address]  [address]  [facility] [address]   [phone]              Future tests that were ordered for you today     Open Future Orders        Priority Expected Expires Ordered    Wet prep Routine  4/17/2018 4/17/2017    UA with Microscopic reflex to Culture Routine  4/17/2018 4/17/2017            Who to contact     If you have questions or need follow up information about today's clinic visit or your schedule please contact [facility] PRIMARY [facility] directly at [phone].  Normal or non-critical lab and imaging results will be communicated to you by MyChart, letter or phone within 4 business days after the clinic has received the results. If you do not hear from us within 7 days, please contact the clinic through [employer] or phone. If you have a critical or abnormal lab result, we will notify you by phone as soon as possible.  Submit refill requests through [employer] or  "call your pharmacy and they will forward the refill request to us. Please allow 3 business days for your refill to be completed.          Additional Information About Your Visit        MyChart Information     [employer] lets you send messages to your doctor, view your test results, renew your prescriptions, schedule appointments and more. To sign up, go to [email or web address] . Click on \"Log in\" on the left side of the screen, which will take you to the Welcome page. Then click on \"Sign up Now\" on the right side of the page.     You will be asked to enter the access code listed below, as well as some personal information. Please follow the directions to create your username and password.     Your access code is: [email or web address]  Expires: 2017  6:03 PM     Your access code will  in 90 days. If you need help or a new code, please call your [place] clinic or [phone].        Care EveryWhere ID     This is your [facility] EveryWhere ID. This could be used by other organizations to access your [place] medical records  [id number]        Your Vitals Were     Temperature                   98.9  F (37.2  C)            Blood Pressure from Last 3 Encounters:   17 122/81   17 121/75   17 117/81    Weight from Last 3 Encounters:   17 173 lb (78.5 kg)   17 174 lb (78.9 kg)   17 177 lb (80.3 kg)               Primary Care Provider Office Phone # Fax #    Deja Cha [phone]       [facility] CARE [address] [place] [address]        Thank you!     Thank you for choosing [facility]IMARY CARE  for your care. Our goal is always to provide you with excellent care. Hearing back from our patients is one way we can continue to improve our services. Please take a few minutes to complete the written survey that you may receive in the mail after your visit with us. Thank you!             Your Updated Medication List - " Protect others around you: Learn how to safely use, store and throw away your medicines at www.disposemymeds.org.          This list is accurate as of: 4/17/17  1:02 PM.  Always use your most recent med list.                   Brand Name Dispense Instructions for use    albuterol 108 (90 BASE) MCG/ACT Inhaler    PROAIR HFA/PROVENTIL HFA/VENTOLIN HFA    1 Inhaler    Inhale 2 puffs into the lungs every 6 hours as needed for shortness of breath / dyspnea       ALPRAZolam 0.5 MG tablet    XANAX    2 tablet    Take 1 tablet (0.5 mg) by mouth 3 times daily as needed for anxiety       amitriptyline 25 MG tablet    ELAVIL    90 tablet    Take 3 tablets (75 mg) by mouth At Bedtime       buPROPion 150 MG 12 hr tablet    WELLBUTRIN SR    180 tablet    Take 1 tablet (150 mg) by mouth 2 times daily       calcium carbonate 500 MG chewable tablet    TUMS    100 tablet    Take 1 tablet (500 mg) by mouth 2 times daily       fluticasone 50 MCG/ACT spray    FLONASE    16 g    Spray 1-2 sprays into both nostrils daily       ketoprofen 10% in PLO 10% topical gel     30 g    Apply 1 g topically every 4 hours as needed for moderate pain       metaxalone 800 MG tablet    SKELAXIN    30 tablet    Take 1 tablet (800 mg) by mouth 3 times daily as needed for moderate pain       methylcellulose 500 MG Tabs tablet    CITRUCEL    30 each    Take 1 tablet (500 mg) by mouth daily       mometasone-formoterol 100-5 MCG/ACT oral inhaler    DULERA    1 Inhaler    Inhale 2 puffs into the lungs 2 times daily       omeprazole 40 MG capsule    priLOSEC    60 capsule    Take 1 capsule (40 mg) by mouth 2 times daily Take 30-60 minutes before a meal.       * order for DME     1 Device    Equipment being ordered: TENS       * order for DME     1 Device    Equipment being ordered: Back brace       oxymetazoline 0.05 % spray    AFRIN NASAL SPRAY    1 Bottle    Spray 2-3 sprays into both nostrils 2 times daily as needed for congestion       polyethylene glycol  powder    MIRALAX    510 g    Take 17 g (1 capful) by mouth daily       prazosin 2 MG capsule    MINIPRESS    30 capsule    Take 1 capsule (2 mg) by mouth At Bedtime       * QUEtiapine 25 MG tablet    SEROQUEL    30 tablet    Take 0.5 tablets (12.5 mg) by mouth 4 times daily as needed For hallucinations and anger and irritability and anxiety       * QUEtiapine 300 MG tablet    SEROquel    90 tablet    Take 1 tablet (300 mg) by mouth At Bedtime       ranitidine 300 MG tablet    ZANTAC    30 tablet    Take 1 tablet (300 mg) by mouth At Bedtime       SUMAtriptan 100 MG tablet    IMITREX    18 tablet    Take 1 tablet (100 mg) by mouth at onset of headache for migraine May repeat dose in 2 hours.  Do not exceed 200 mg in 24 hours       * Notice:  This list has 4 medication(s) that are the same as other medications prescribed for you. Read the directions carefully, and ask your doctor or other care provider to review them with you.

## 2017-04-18 RX ORDER — BUPROPION HYDROCHLORIDE 150 MG/1
150 TABLET, EXTENDED RELEASE ORAL 2 TIMES DAILY
Qty: 180 TABLET | Refills: 1 | Status: SHIPPED | OUTPATIENT
Start: 2017-04-18 | End: 2018-05-02

## 2017-04-19 ENCOUNTER — TELEPHONE (OUTPATIENT)
Dept: FAMILY MEDICINE | Facility: CLINIC | Age: 47
End: 2017-04-19

## 2017-04-19 LAB
BACTERIA SPEC CULT: NORMAL
MICRO REPORT STATUS: NORMAL
SPECIMEN SOURCE: NORMAL

## 2017-04-19 NOTE — TELEPHONE ENCOUNTER
Spoke with pt in regards to UAUC result and pt requested xanax, request already routed to provider. Closing enocunter.  Leonarda Singh RN

## 2017-04-19 NOTE — TELEPHONE ENCOUNTER
Incoming call from pt requesting a prescription ALPRAZolam (XANAX) 0.5 MG to help with anxiety during MRI. Pt is scheduled for an MRI on 05/01/17.     Carleen Garcia

## 2017-04-20 ENCOUNTER — TELEPHONE (OUTPATIENT)
Dept: FAMILY MEDICINE | Facility: CLINIC | Age: 47
End: 2017-04-20

## 2017-04-20 NOTE — TELEPHONE ENCOUNTER
Writer attempted to call pt, No answer.  LVM for Pt to call writer back at [phone].    Neftaly Flores RN

## 2017-04-20 NOTE — TELEPHONE ENCOUNTER
Incoming call from pt requesting pain medication; pt states she has severe back pain. Pt also believes she has a UTI, because it burns when she urinates.     Carleen Garcia

## 2017-04-25 NOTE — TELEPHONE ENCOUNTER
Called patient to discuss back pain and urinary symptoms further, no answer, left VM requesting call back.  Leonarda Singh RN       Controlled Substance Refill Request for Norco    Last refill: 3/18/17    Last clinic visit: 4/17/17    Next appt: 5/4/17    Controlled substance agreement on file: Yes:  Date 1/23/17.    Documentation in problem list reviewed:  Yes    Processing:  Patient will  in clinic    [place] reviewed:  reviewed- no concerns  [place]  [email or web address]

## 2017-04-27 NOTE — TELEPHONE ENCOUNTER
Hello,    We had requested a refill on the patient's ibuprofen, it has not been addressed yet.  Please review/authorize the request for Ibuprofen on this encounter.      Thank you,  Mirna Vick CPhT  On behalf of [facility]

## 2017-05-01 DIAGNOSIS — M25.561 RIGHT KNEE PAIN, UNSPECIFIED CHRONICITY: Primary | ICD-10-CM

## 2017-05-05 ENCOUNTER — OFFICE VISIT (OUTPATIENT)
Dept: FAMILY MEDICINE | Facility: CLINIC | Age: 47
End: 2017-05-05
Payer: COMMERCIAL

## 2017-05-05 ENCOUNTER — TELEPHONE (OUTPATIENT)
Dept: FAMILY MEDICINE | Facility: CLINIC | Age: 47
End: 2017-05-05

## 2017-05-05 VITALS
DIASTOLIC BLOOD PRESSURE: 72 MMHG | HEIGHT: 61 IN | RESPIRATION RATE: 14 BRPM | OXYGEN SATURATION: 99 % | SYSTOLIC BLOOD PRESSURE: 110 MMHG | TEMPERATURE: 99.1 F | BODY MASS INDEX: 30.58 KG/M2 | WEIGHT: 162 LBS | HEART RATE: 76 BPM

## 2017-05-05 DIAGNOSIS — M47.26 OSTEOARTHRITIS OF SPINE WITH RADICULOPATHY, LUMBAR REGION: Primary | ICD-10-CM

## 2017-05-05 PROCEDURE — 99214 OFFICE O/P EST MOD 30 MIN: CPT | Performed by: PHYSICIAN ASSISTANT

## 2017-05-05 RX ORDER — CHOLECALCIFEROL (VITAMIN D3) 50 MCG
2000 TABLET ORAL DAILY
Qty: 100 TABLET | Refills: 3 | Status: SHIPPED | OUTPATIENT
Start: 2017-05-05 | End: 2018-05-02

## 2017-05-05 RX ORDER — PREDNISONE 20 MG/1
60 TABLET ORAL DAILY
Qty: 15 TABLET | Refills: 0 | Status: SHIPPED | OUTPATIENT
Start: 2017-05-05 | End: 2017-06-29

## 2017-05-05 RX ORDER — CYCLOBENZAPRINE HCL 5 MG
5 TABLET ORAL
Qty: 20 TABLET | Refills: 0 | Status: SHIPPED | OUTPATIENT
Start: 2017-05-05 | End: 2017-05-15

## 2017-05-05 RX ORDER — MULTIPLE VITAMINS W/ MINERALS TAB 9MG-400MCG
1 TAB ORAL DAILY
Qty: 100 TABLET | Refills: 3 | Status: SHIPPED | OUTPATIENT
Start: 2017-05-05

## 2017-05-05 NOTE — MR AVS SNAPSHOT
After Visit Summary   5/5/2017    Phil Mckoy    MRN: [id number]           Patient Information     Date Of Birth          1970        Visit Information        Provider Department      5/5/2017 10:30 AM Dagoberto Chua PA-C [facility]        Today's Diagnoses     Osteoarthritis of spine with radiculopathy, lumbar region    -  1      Care Instructions      Causes of Lumbar (Low Back) Pain  Low back pain can be caused by problems with any part of the lumbar spine. A disk can herniate (push out) and press on a nerve. Vertebrae can rub against each other or slip out of place. This can irritate facet joints and nerves. It can also lead to stenosis, a narrowing of the spinal canal or foramen.  Pressure from a disk  Constant wear and tear on a disk can cause it to weaken and push outward. Part of the disk may then press on nearby nerves. There are two common types of herniated disks:  Contained means the soft nucleus is protruding outward.   Extruded means the firm annulus has torn, letting the soft center squeeze through.     Pressure from bone  An unstable spine   With age, a disk may thin and wear out. Vertebrae above and below the disk may begin to touch. This can put pressure on nerves. It can also cause bone spurs (growths) to form where the bones rub together.    Stenosis results when bone spurs narrow the foramen or spinal canal. This also puts pressure on nerves. Slipping vertebrae can irritate nerves and joints. They can also worsen stenosis.    In some cases, vertebrae become unstable and slip forward. This is called spondylolisthesis.       0490-6241 [employer] [address] [place] [address]. All rights reserved. This information is not intended as a substitute for professional medical care. Always follow your healthcare professional's instructions.        Exercises To Strengthen Your Lower Back  Strong lower-back and  abdominal muscles work together to support your spine. The exercises below will help strengthen the muscles of the lower back. It is important that you begin exercising slowly and increase levels gradually.  Always begin any exercise program with stretching. If you feel pain while doing any of these exercises, stop and talk to your doctor about a more specific exercise program that better suits your condition.   Low back stretch  The point of stretching is to make you more flexible and increase your range of motion. Stretch only as much as you are able. Stretch slowly. Do not push your stretch to the limit. If at any point you feel pain while stretching, this is your (temporary) limit.    Lie on your back with your knees bent and both feet on the ground.    Slowly raise your left knee to your chest as you flatten your lower back against the floor. Hold for 5 seconds.    Relax and repeat the exercise with your right knee.    Do 10 of these exercises for each leg.    Repeat hugging both knees to your chest at the same time.  Building lower back strength  Start your exercise routine with 10 to 30 minutes a day, 1 to 3 times a day.  Initial exercises  Lying on your back:  1. Ankle pumps: Move your foot up and down, towards your head, and then away. Repeat 10 times with each foot.  2. Heel slides: Slowly bend your knee, drawing the heel of your foot towards you. Then slide your heel/foot from you, straightening your knee. Do not lift your foot off the floor (this is not a leg lift).  3. Abdominal contraction: Bend your knees and put your hands on your stomach. Tighten your stomach muscles. Hold for 5 seconds, then relax. Repeat 10 times.  4. Straight leg raise: Bend one leg at the knee and keep the other leg straight. Tighten your stomach muscles. Slowly lift your straight leg 6 to 12 inches off the floor and hold for up to 5 seconds. Repeat 10 times on each side.  Standin. Wall squats: Stand with your back against  the wall. Move your feet about 12 inches away from the wall. Tighten your stomach muscles, and slowly bend your knees until they are at about a 45 degree angle. Do not go down too far. Hold about 5 seconds. Then slowly return to your starting position. Repeat 10 times.  2. Heel raises: Stand facing the wall. Slowly raise the heels of your feet up and down, while keeping your toes on the floor. If you have trouble balancing, you can touch the wall with your hands. Repeat 10 times.  More advanced exercises  When you feel comfortable enough, try these exercises.  1. Kneeling lumbar extension: Begin on your hands and knees. At the same time, raise and straighten your right arm and left leg until they are parallel to the ground. Hold for 2 seconds and come back slowly to a starting position. Repeat with left arm and right leg, alternating 10 times.  2. Prone lumbar extension: Lie face down, arms extended overhead, palms on the floor. At the same time, raise your right arm and left leg as high as comfortably possible. Hold for 10 seconds and slowly return to start. Repeat with left arm and right leg, alternating 10 times. Gradually build up to 20 times. (Advanced: Repeat this exercise raising both arms and both legs a few inches off the floor at the same time. Hold for 5 seconds and release.)  3. Pelvic tilt: Lie on the floor on your back with your knees bent at 90 degrees. Your feet should be flat on the floor. Inhale, exhale, then slowly contract your abdominal muscles bringing your navel toward your spine. Let your pelvis rock back until your lower back is flat on the floor. Hold for 10 seconds while breathing smoothly.  4. Abdominal crunch: Perform a pelvic tilt (above) flattening your lower back against the floor. Holding the tension in your abdominal muscles, take another breath and raise your shoulder blades off the ground (this is not a full sit-up). Keep your head in line with your body (don t bend your neck  forward). Hold for 2 seconds, then slowly lower.    8061-4681 [employer]. [address] [place] [address], [place] [address]. All rights reserved. This information is not intended as a substitute for professional medical care. Always follow your healthcare professional's instructions.              Follow-ups after your visit        Your next 10 appointments already scheduled     May 05, 2017 12:40 PM CDT   XR KNEE AP STANDING BILATERAL with [email or web address]   [facility] Orthopaedics XRay ([facility])    [address] [place] [address]  [address] [facility] [id number] [phone]           Please bring a list of your current medicines to your exam. (Include vitamins, minerals and over-thecounter medicines.) Leave your valuables at home.  Tell your doctor if there is a chance you may be pregnant.  You do not need to do anything special for this exam.            May 05, 2017 12:40 PM CDT   XR KNEE RIGHT 1/2 VIEWS with [email or web address]   [facility] XRay ([facility])    [address] [place] [id number] [phone]           Please bring a list of your current medicines to your exam. (Include vitamins, minerals and over-thecounter medicines.) Leave your valuables at home.  Tell your doctor if there is a chance you may be pregnant.  You do not need to do anything special for this exam.            May 05, 2017  1:00 PM CDT   (Arrive by 12:45 PM)   New Patient Visit with Reuben Varner MD   [facility] Sports Medicine ([facility])    [address] [place] [address]  [address] [facility] [address]   [phone]            May 05, 2017  2:30 PM CDT   (Arrive by 2:15 PM)   MR KNEE RIGHT W/O CONTRAST with UCMR1   [facility] MRI ([facility])    [address] [place] [id number] [phone]           Take your medicines as usual, unless your doctor tells you not to.  Bring a list of your current medicines to your exam (including vitamins, minerals and over-the-counter drugs). Also bring the results of similar scans you may have had.  Please remove any body piercings and hair extensions before you arrive.  Follow your doctor s orders. If you do not, we may have to postpone your exam.  You will not have contrast for this exam. You do not need to do anything special to prepare.  The MRI machine uses a strong magnet. Please wear clothes without metal (snaps, zippers). A sweatsuit works well, or we may give you a hospital gown.   **IMPORTANT** THE INSTRUCTIONS BELOW ARE ONLY FOR THOSE PATIENTS WHO HAVE BEEN TOLD THEY WILL RECEIVE SEDATION OR GENERAL ANESTHESIA DURING THEIR MRI PROCEDURE:  IF YOU WILL RECEIVE SEDATION (take medicine to help you relax during your exam):   You must get the medicine from your doctor before you arrive. Bring the medicine to the exam. Do not take it at home.   Arrive one hour early. Bring someone who can take you home after the test. Your medicine will make you sleepy. After the exam, you may not drive, take a bus or take a taxi by yourself.   No eating 8 hours before your exam. You may have clear liquids up until 4 hours before your exam. (Clear liquids include water, clear tea, black coffee and fruit juice without pulp.)  IF YOU WILL RECEIVE ANESTHESIA (be asleep for your exam):   Arrive 1 1/2 hours early. Bring someone who can take you home after the test. You may not drive, take a bus or take a taxi by yourself.   No eating 8 hours before your exam. You may have clear liquids up until 4 hours before your exam. (Clear liquids include water, clear tea, black coffee and fruit juice without pulp.)   You will spend four to five hours in the recovery room.  Please call the Imaging Department at your exam site with any questions.            May 10, 2017  2:30 PM CDT   Return Visit with Dagoberto Chua PA-C   [facility]  "([facility])    [address]  [address]  [facility] [id number] [phone]              Who to contact     If you have questions or need follow up information about today's clinic visit or your schedule please contact [facility] directly at [phone].  Normal or non-critical lab and imaging results will be communicated to you by MyChart, letter or phone within 4 business days after the clinic has received the results. If you do not hear from us within 7 days, please contact the clinic through MyChart or phone. If you have a critical or abnormal lab result, we will notify you by phone as soon as possible.  Submit refill requests through [employer] or call your pharmacy and they will forward the refill request to us. Please allow 3 business days for your refill to be completed.          Additional Information About Your Visit        [employer] Information     [employer] lets you send messages to your doctor, view your test results, renew your prescriptions, schedule appointments and more. To sign up, go to [email or web address] . Click on \"Log in\" on the left side of the screen, which will take you to the Welcome page. Then click on \"Sign up Now\" on the right side of the page.     You will be asked to enter the access code listed below, as well as some personal information. Please follow the directions to create your username and password.     Your access code is: [email or web address]  Expires: 2017  6:30 AM     Your access code will  in 90 days. If you need help or a new code, please call your [facility] or [phone].        Care EveryWhere ID     This is your Care EveryWhere ID. This could be used by other organizations to access your [place] medical records  [id number]        Your Vitals Were     Pulse Temperature Respirations Height Pulse Oximetry BMI (Body Mass Index)    76 99.1  F (37.3  C) (Oral) 14 5' 1\" (1.549 m) 99% 30.61 kg/m2    "    Blood Pressure from Last 3 Encounters:   05/05/17 110/72   03/29/17 122/81   03/18/17 121/75    Weight from Last 3 Encounters:   05/05/17 162 lb (73.5 kg)   03/29/17 173 lb (78.5 kg)   03/03/17 174 lb (78.9 kg)              Today, you had the following     No orders found for display         Today's Medication Changes          These changes are accurate as of: 5/5/17 10:59 AM.  If you have any questions, ask your nurse or doctor.               Start taking these medicines.        Dose/Directions    cyclobenzaprine 5 MG tablet   Commonly known as:  FLEXERIL   Used for:  Osteoarthritis of spine with radiculopathy, lumbar region   Started by:  Dagoberto Chua PA-C        Dose:  5 mg   Take 1 tablet (5 mg) by mouth nightly as needed for muscle spasms   Quantity:  20 tablet   Refills:  0       multivitamin, therapeutic with minerals Tabs tablet   Used for:  Osteoarthritis of spine with radiculopathy, lumbar region   Started by:  Dagoberto Chua PA-C        Dose:  1 tablet   Take 1 tablet by mouth daily   Quantity:  100 tablet   Refills:  3       predniSONE 20 MG tablet   Commonly known as:  DELTASONE   Used for:  Osteoarthritis of spine with radiculopathy, lumbar region   Started by:  Dagoberto Chua PA-C        Dose:  60 mg   Take 3 tablets (60 mg) by mouth daily   Quantity:  15 tablet   Refills:  0       vitamin D 2000 UNITS tablet   Used for:  Osteoarthritis of spine with radiculopathy, lumbar region   Started by:  Dagoberto Chua PA-C        Dose:  2000 Units   Take 2,000 Units by mouth daily   Quantity:  100 tablet   Refills:  3         Stop taking these medicines if you haven't already. Please contact your care team if you have questions.     metaxalone 800 MG tablet   Commonly known as:  SKELAXIN   Stopped by:  Dagoberto Chua PA-C           omeprazole 40 MG capsule   Commonly known as:  priLOSEC   Stopped by:  Dagoberto Chua PA-C                Where to get your  medicines      These medications were sent to [facility] [place], [place] - [address]  [address], [facility] [address]     Phone:  [phone]     cyclobenzaprine 5 MG tablet    multivitamin, therapeutic with minerals Tabs tablet    predniSONE 20 MG tablet    vitamin D 2000 UNITS tablet                Primary Care Provider Office Phone # Fax #    Deja Cha [phone]       [facility] [address]  [facility] [address]        Thank you!     Thank you for choosing [facility]  for your care. Our goal is always to provide you with excellent care. Hearing back from our patients is one way we can continue to improve our services. Please take a few minutes to complete the written survey that you may receive in the mail after your visit with us. Thank you!             Your Updated Medication List - Protect others around you: Learn how to safely use, store and throw away your medicines at www.disposemymeds.org.          This list is accurate as of: 5/5/17 10:59 AM.  Always use your most recent med list.                   Brand Name Dispense Instructions for use    albuterol 108 (90 BASE) MCG/ACT Inhaler    PROAIR HFA/PROVENTIL HFA/VENTOLIN HFA    1 Inhaler    Inhale 2 puffs into the lungs every 6 hours as needed for shortness of breath / dyspnea       ALPRAZolam 0.5 MG tablet    XANAX    2 tablet    Take 1 tablet (0.5 mg) by mouth 3 times daily as needed for anxiety       amitriptyline 25 MG tablet    ELAVIL    90 tablet    Take 3 tablets (75 mg) by mouth At Bedtime       buPROPion 150 MG 12 hr tablet    WELLBUTRIN SR    180 tablet    Take 1 tablet (150 mg) by mouth 2 times daily       calcium carbonate 500 MG chewable tablet    TUMS    100 tablet    Take 1 tablet (500 mg) by mouth 2 times daily       cyclobenzaprine 5 MG tablet    FLEXERIL    20 tablet    Take 1 tablet (5 mg) by mouth nightly as needed for  muscle spasms       fluticasone 50 MCG/ACT spray    FLONASE    16 g    Spray 1-2 sprays into both nostrils daily       ketoprofen 10% in PLO 10% topical gel     30 g    Apply 1 g topically every 4 hours as needed for moderate pain       methylcellulose 500 MG Tabs tablet    CITRUCEL    30 each    Take 1 tablet (500 mg) by mouth daily       mometasone-formoterol 100-5 MCG/ACT oral inhaler    DULERA    1 Inhaler    Inhale 2 puffs into the lungs 2 times daily       multivitamin, therapeutic with minerals Tabs tablet     100 tablet    Take 1 tablet by mouth daily       * order for DME     1 Device    Equipment being ordered: TENS       * order for DME     1 Device    Equipment being ordered: Back brace       oxymetazoline 0.05 % spray    AFRIN NASAL SPRAY    1 Bottle    Spray 2-3 sprays into both nostrils 2 times daily as needed for congestion       polyethylene glycol powder    MIRALAX    510 g    Take 17 g (1 capful) by mouth daily       prazosin 2 MG capsule    MINIPRESS    30 capsule    Take 1 capsule (2 mg) by mouth At Bedtime       predniSONE 20 MG tablet    DELTASONE    15 tablet    Take 3 tablets (60 mg) by mouth daily       * QUEtiapine 25 MG tablet    SEROQUEL    30 tablet    Take 0.5 tablets (12.5 mg) by mouth 4 times daily as needed For hallucinations and anger and irritability and anxiety       * QUEtiapine 300 MG tablet    SEROquel    90 tablet    Take 1 tablet (300 mg) by mouth At Bedtime       ranitidine 300 MG tablet    ZANTAC    30 tablet    Take 1 tablet (300 mg) by mouth At Bedtime       SUMAtriptan 100 MG tablet    IMITREX    18 tablet    Take 1 tablet (100 mg) by mouth at onset of headache for migraine May repeat dose in 2 hours.  Do not exceed 200 mg in 24 hours       vitamin D 2000 UNITS tablet     100 tablet    Take 2,000 Units by mouth daily       * Notice:  This list has 4 medication(s) that are the same as other medications prescribed for you. Read the directions carefully, and ask your  doctor or other care provider to review them with you.

## 2017-05-05 NOTE — PATIENT INSTRUCTIONS
Causes of Lumbar (Low Back) Pain  Low back pain can be caused by problems with any part of the lumbar spine. A disk can herniate (push out) and press on a nerve. Vertebrae can rub against each other or slip out of place. This can irritate facet joints and nerves. It can also lead to stenosis, a narrowing of the spinal canal or foramen.  Pressure from a disk  Constant wear and tear on a disk can cause it to weaken and push outward. Part of the disk may then press on nearby nerves. There are two common types of herniated disks:  Contained means the soft nucleus is protruding outward.   Extruded means the firm annulus has torn, letting the soft center squeeze through.     Pressure from bone  An unstable spine   With age, a disk may thin and wear out. Vertebrae above and below the disk may begin to touch. This can put pressure on nerves. It can also cause bone spurs (growths) to form where the bones rub together.    Stenosis results when bone spurs narrow the foramen or spinal canal. This also puts pressure on nerves. Slipping vertebrae can irritate nerves and joints. They can also worsen stenosis.    In some cases, vertebrae become unstable and slip forward. This is called spondylolisthesis.       2187-6700 [employer]. [address] [place] [address], [place] [address]. All rights reserved. This information is not intended as a substitute for professional medical care. Always follow your healthcare professional's instructions.        Exercises To Strengthen Your Lower Back  Strong lower-back and abdominal muscles work together to support your spine. The exercises below will help strengthen the muscles of the lower back. It is important that you begin exercising slowly and increase levels gradually.  Always begin any exercise program with stretching. If you feel pain while doing any of these exercises, stop and talk to your doctor about a more specific exercise program that better suits your condition.   Low  back stretch  The point of stretching is to make you more flexible and increase your range of motion. Stretch only as much as you are able. Stretch slowly. Do not push your stretch to the limit. If at any point you feel pain while stretching, this is your (temporary) limit.    Lie on your back with your knees bent and both feet on the ground.    Slowly raise your left knee to your chest as you flatten your lower back against the floor. Hold for 5 seconds.    Relax and repeat the exercise with your right knee.    Do 10 of these exercises for each leg.    Repeat hugging both knees to your chest at the same time.  Building lower back strength  Start your exercise routine with 10 to 30 minutes a day, 1 to 3 times a day.  Initial exercises  Lying on your back:  1. Ankle pumps: Move your foot up and down, towards your head, and then away. Repeat 10 times with each foot.  2. Heel slides: Slowly bend your knee, drawing the heel of your foot towards you. Then slide your heel/foot from you, straightening your knee. Do not lift your foot off the floor (this is not a leg lift).  3. Abdominal contraction: Bend your knees and put your hands on your stomach. Tighten your stomach muscles. Hold for 5 seconds, then relax. Repeat 10 times.  4. Straight leg raise: Bend one leg at the knee and keep the other leg straight. Tighten your stomach muscles. Slowly lift your straight leg 6 to 12 inches off the floor and hold for up to 5 seconds. Repeat 10 times on each side.  Standin. Wall squats: Stand with your back against the wall. Move your feet about 12 inches away from the wall. Tighten your stomach muscles, and slowly bend your knees until they are at about a 45 degree angle. Do not go down too far. Hold about 5 seconds. Then slowly return to your starting position. Repeat 10 times.  2. Heel raises: Stand facing the wall. Slowly raise the heels of your feet up and down, while keeping your toes on the floor. If you have trouble  balancing, you can touch the wall with your hands. Repeat 10 times.  More advanced exercises  When you feel comfortable enough, try these exercises.  1. Kneeling lumbar extension: Begin on your hands and knees. At the same time, raise and straighten your right arm and left leg until they are parallel to the ground. Hold for 2 seconds and come back slowly to a starting position. Repeat with left arm and right leg, alternating 10 times.  2. Prone lumbar extension: Lie face down, arms extended overhead, palms on the floor. At the same time, raise your right arm and left leg as high as comfortably possible. Hold for 10 seconds and slowly return to start. Repeat with left arm and right leg, alternating 10 times. Gradually build up to 20 times. (Advanced: Repeat this exercise raising both arms and both legs a few inches off the floor at the same time. Hold for 5 seconds and release.)  3. Pelvic tilt: Lie on the floor on your back with your knees bent at 90 degrees. Your feet should be flat on the floor. Inhale, exhale, then slowly contract your abdominal muscles bringing your navel toward your spine. Let your pelvis rock back until your lower back is flat on the floor. Hold for 10 seconds while breathing smoothly.  4. Abdominal crunch: Perform a pelvic tilt (above) flattening your lower back against the floor. Holding the tension in your abdominal muscles, take another breath and raise your shoulder blades off the ground (this is not a full sit-up). Keep your head in line with your body (don t bend your neck forward). Hold for 2 seconds, then slowly lower.    2569-8352 [employer]. [address] [place] [address], [place] [address]. All rights reserved. This information is not intended as a substitute for professional medical care. Always follow your healthcare professional's instructions.

## 2017-05-05 NOTE — TELEPHONE ENCOUNTER
Incoming call from Patti at [facility] pharmacy requesting clarification on Flexeril script. Pharmacy staff states that pt has a prescription for Metaxalone 800 MG ready for pick-up, and is wondering if pt is supposed to taking both Flexeril and Metaxalone.     Carleen Garcia

## 2017-05-05 NOTE — NURSING NOTE
"Chief Complaint   Patient presents with     Pain       Initial /72  Pulse 76  Temp 99.1  F (37.3  C) (Oral)  Resp 14  Ht 5' 1\" (1.549 m)  Wt 162 lb (73.5 kg)  SpO2 99%  BMI 30.61 kg/m2 Estimated body mass index is 30.61 kg/(m^2) as calculated from the following:    Height as of this encounter: 5' 1\" (1.549 m).    Weight as of this encounter: 162 lb (73.5 kg).  Medication Reconciliation: complete     Alaina Montoya MA      "

## 2017-05-05 NOTE — PROGRESS NOTES
SUBJECTIVE:                                                    Phil Mckoy is a 46 year old female who presents to clinic today for the following health issues:    Chronic Pain Follow-Up       Type / Location of Pain: Low Back  Analgesia/pain control:       Recent changes:  worse      Overall control: Inadequate pain control  Activity level/function:      Daily activities:  Unable to perform most daily activities - chores, hobbies, social activities, driving    Work:  Unable to work  Adverse effects:  No  Adherance    Taking medication as directed?  Yes    Participating in other treatments: no   Risk Factors:    Sleep:  Poor    Mood/anxiety:  controlled    Recent family or social stressors:  none noted    Other aggravating factors: prolonged sitting, prolonged standing and walking  PHQ-9 SCORE 6/29/2016 11/29/2016 12/26/2016   Total Score - - -   Total Score - - -   Total Score 0 0 0     JENNIE-7 SCORE 8/11/2015 8/26/2015 11/27/2015   Total Score - - -   Total Score - 0 0   Total Score 0 - -     Encounter-Level CSA - 01/19/2017:                 Controlled Substance Agreement - Scan on 1/23/2017  3:52 PM : CONTROLLED SUBSTANCE AGREEMENT (below)          Encounter-Level CSA - 01/19/2017:                 Controlled Substance Agreement - Scan on 2/10/2016 11:17 AM : CONTROLLED SUBSTANCE AGREEMENT (below)          Encounter-Level CSA - 01/19/2017:                 Controlled Substance Agreement - Scan on 3/13/2015 10:10 AM : Controlled Substance Agreement 03/12/15 (below)                 Amount of exercise or physical activity: None    Problems taking medications regularly: No    Medication side effects: none    Diet: regular (no restrictions)        Problem list and histories reviewed & adjusted, as indicated.  Additional history: Reviewed effective and ineffective therapies.  Patient didn't respond to injection or pool therapies.  Patient notes that her pain levels escalated in the center of her low back and she  "felt numbness in that respective area as well.  The numbness has decreased but pain persists.  Patient will schedule her Sports Medicine consultation for next week.     Reviewed and updated as needed this visit by clinical staff  Tobacco  Allergies  Meds  Med Hx  Surg Hx  Fam Hx  Soc Hx      Reviewed and updated as needed this visit by Provider         ROS:  Constitutional, HEENT, cardiovascular, pulmonary, gi and gu systems are negative, except as otherwise noted.    OBJECTIVE:                                                    /72  Pulse 76  Temp 99.1  F (37.3  C) (Oral)  Resp 14  Ht 5' 1\" (1.549 m)  Wt 162 lb (73.5 kg)  SpO2 99%  BMI 30.61 kg/m2  Body mass index is 30.61 kg/(m^2).  GENERAL: healthy, alert and no distress  SKIN: no suspicious lesions or rashes  Comprehensive back pain exam:  No tenderness, Pain limits the following motions: Flexion, extension, lateral rotation and +Facet Loading bilat., unable to heel walk on right side, indicating possible S1 nerve involvement and unable to toe walk on right side, indicating possible L4 nerve involvement, Lower extremity reflexes within normal limits bilaterally and Straight leg raise not tested.   PSYCH: Psych: Alert and oriented times 3; coherent speech, normal   rate and volume, able to articulate logical thoughts, able   to abstract reason, no tangential thoughts, no hallucinations   or delusions  Her affect is congruent     ASSESSMENT/PLAN:                                                    1. Osteoarthritis of spine with radiculopathy, lumbar region  - predniSONE (DELTASONE) 20 MG tablet; Take 3 tablets (60 mg) by mouth daily  Dispense: 15 tablet; Refill: 0  - cyclobenzaprine (FLEXERIL) 5 MG tablet; Take 1 tablet (5 mg) by mouth nightly as needed for muscle spasms  Dispense: 20 tablet; Refill: 0  - multivitamin, therapeutic with minerals (MULTI-VITAMIN) TABS tablet; Take 1 tablet by mouth daily  Dispense: 100 tablet; Refill: 3  - " Cholecalciferol (VITAMIN D) 2000 UNITS tablet; Take 2,000 Units by mouth daily  Dispense: 100 tablet; Refill: 3    Use medication as directed.  Start stabilization exercises after steroid course  Follow up per Sports Medicine  Follow up if symptoms should persist, change or worsen.  Consider repeat MRI and referral to Spine then if warranted.  Patient amenable to this follow up plan.     Dagoberto Chua PA-C  [facility]

## 2017-05-10 ENCOUNTER — OFFICE VISIT (OUTPATIENT)
Dept: FAMILY MEDICINE | Facility: CLINIC | Age: 47
End: 2017-05-10
Payer: COMMERCIAL

## 2017-05-10 VITALS
TEMPERATURE: 99.2 F | OXYGEN SATURATION: 98 % | BODY MASS INDEX: 31.08 KG/M2 | WEIGHT: 164.5 LBS | RESPIRATION RATE: 14 BRPM | DIASTOLIC BLOOD PRESSURE: 90 MMHG | SYSTOLIC BLOOD PRESSURE: 140 MMHG | HEART RATE: 80 BPM

## 2017-05-10 DIAGNOSIS — N39.0 URINARY TRACT INFECTION WITH HEMATURIA, SITE UNSPECIFIED: Primary | ICD-10-CM

## 2017-05-10 DIAGNOSIS — R31.9 URINARY TRACT INFECTION WITH HEMATURIA, SITE UNSPECIFIED: Primary | ICD-10-CM

## 2017-05-10 DIAGNOSIS — R31.0 GROSS HEMATURIA: ICD-10-CM

## 2017-05-10 DIAGNOSIS — R82.90 NONSPECIFIC FINDING ON EXAMINATION OF URINE: ICD-10-CM

## 2017-05-10 LAB
ALBUMIN UR-MCNC: 30 MG/DL
APPEARANCE UR: CLEAR
BACTERIA #/AREA URNS HPF: ABNORMAL /HPF
BILIRUB UR QL STRIP: NEGATIVE
COLOR UR AUTO: YELLOW
GLUCOSE UR STRIP-MCNC: NEGATIVE MG/DL
HGB UR QL STRIP: ABNORMAL
KETONES UR STRIP-MCNC: ABNORMAL MG/DL
LEUKOCYTE ESTERASE UR QL STRIP: ABNORMAL
NITRATE UR QL: NEGATIVE
NON-SQ EPI CELLS #/AREA URNS LPF: ABNORMAL /LPF
PH UR STRIP: 7 PH (ref 5–7)
RBC #/AREA URNS AUTO: ABNORMAL /HPF (ref 0–2)
SP GR UR STRIP: 1.02 (ref 1–1.03)
URN SPEC COLLECT METH UR: ABNORMAL
UROBILINOGEN UR STRIP-ACNC: 0.2 EU/DL (ref 0.2–1)
WBC #/AREA URNS AUTO: ABNORMAL /HPF (ref 0–2)

## 2017-05-10 PROCEDURE — 87086 URINE CULTURE/COLONY COUNT: CPT | Performed by: PHYSICIAN ASSISTANT

## 2017-05-10 PROCEDURE — 99213 OFFICE O/P EST LOW 20 MIN: CPT | Performed by: PHYSICIAN ASSISTANT

## 2017-05-10 PROCEDURE — 87088 URINE BACTERIA CULTURE: CPT | Performed by: PHYSICIAN ASSISTANT

## 2017-05-10 PROCEDURE — 87186 SC STD MICRODIL/AGAR DIL: CPT | Performed by: PHYSICIAN ASSISTANT

## 2017-05-10 PROCEDURE — 81001 URINALYSIS AUTO W/SCOPE: CPT | Performed by: PHYSICIAN ASSISTANT

## 2017-05-10 RX ORDER — CIPROFLOXACIN 250 MG/1
250 TABLET, FILM COATED ORAL 2 TIMES DAILY
Qty: 10 TABLET | Refills: 0 | Status: SHIPPED | OUTPATIENT
Start: 2017-05-10 | End: 2017-05-12 | Stop reason: ALTCHOICE

## 2017-05-10 NOTE — Clinical Note
Please notify Patient that infection resistant to Cipro.  Script for Nitrofurantoin sent to [facility].   Thank you, Joe VARGAS

## 2017-05-10 NOTE — MR AVS SNAPSHOT
"              After Visit Summary   5/10/2017    Phil Mckoy    MRN: [id number]           Patient Information     Date Of Birth          1970        Visit Information        Provider Department      5/10/2017 2:30 PM Dagoberto Chua PA-C [facility]        Today's Diagnoses     Urinary tract infection with hematuria, site unspecified    -  1    Gross hematuria           Follow-ups after your visit        Your next 10 appointments already scheduled     May 15, 2017  2:00 PM CDT   (Arrive by 1:45 PM)   New Patient Visit with Reuben Varner MD   [facility] ([facility])    [address] [place] [address]  5th Floor  [facility] [address] [phone]              Who to contact     If you have questions or need follow up information about today's clinic visit or your schedule please contact [facility] directly at [phone].  Normal or non-critical lab and imaging results will be communicated to you by MyChart, letter or phone within 4 business days after the clinic has received the results. If you do not hear from us within 7 days, please contact the clinic through MyChart or phone. If you have a critical or abnormal lab result, we will notify you by phone as soon as possible.  Submit refill requests through [employer] or call your pharmacy and they will forward the refill request to us. Please allow 3 business days for your refill to be completed.          Additional Information About Your Visit        MyChart Information     [employer] lets you send messages to your doctor, view your test results, renew your prescriptions, schedule appointments and more. To sign up, go to [email or web address] . Click on \"Log in\" on the left side of the screen, which will take you to the Welcome page. Then click on \"Sign up Now\" on the right side of the page.     You will be asked to enter the access code listed " below, as well as some personal information. Please follow the directions to create your username and password.     Your access code is: [email or web address]  Expires: 2017  6:30 AM     Your access code will  in 90 days. If you need help or a new code, please call your [place] clinic or [phone].        Care EveryWhere ID     This is your Care EveryWhere ID. This could be used by other organizations to access your [place] medical records  [id number]        Your Vitals Were     Pulse Temperature Respirations Pulse Oximetry BMI (Body Mass Index)       80 99.2  F (37.3  C) (Oral) 14 98% 31.08 kg/m2        Blood Pressure from Last 3 Encounters:   05/10/17 140/90   17 110/72   17 122/81    Weight from Last 3 Encounters:   05/10/17 164 lb 8 oz (74.6 kg)   17 162 lb (73.5 kg)   17 173 lb (78.5 kg)              We Performed the Following     *UA reflex to Microscopic and Culture ([facility] [facility] (except [place] and [place])     Urine Microscopic          Today's Medication Changes          These changes are accurate as of: 5/10/17  3:17 PM.  If you have any questions, ask your nurse or doctor.               Start taking these medicines.        Dose/Directions    ciprofloxacin 250 MG tablet   Commonly known as:  CIPRO   Used for:  Urinary tract infection with hematuria, site unspecified   Started by:  Dagoberto Chua PA-C        Dose:  250 mg   Take 1 tablet (250 mg) by mouth 2 times daily for 5 days   Quantity:  10 tablet   Refills:  0         Stop taking these medicines if you haven't already. Please contact your care team if you have questions.     ketoprofen 10% in PLO 10% topical gel   Stopped by:  Dagoberto Chua PA-C                Where to get your medicines      These medications were sent to [facility] [place], [place] - [address]  [address] [address]     Phone:  [phone]     ciprofloxacin 250 MG  tablet                Primary Care Provider Office Phone # Fax #    Deja Cha [phone]       Critical access hospital CARE [facility] [address] [address]        Thank you!     Thank you for choosing [facility]  for your care. Our goal is always to provide you with excellent care. Hearing back from our patients is one way we can continue to improve our services. Please take a few minutes to complete the written survey that you may receive in the mail after your visit with us. Thank you!             Your Updated Medication List - Protect others around you: Learn how to safely use, store and throw away your medicines at www.disposemymeds.org.          This list is accurate as of: 5/10/17  3:17 PM.  Always use your most recent med list.                   Brand Name Dispense Instructions for use    albuterol 108 (90 BASE) MCG/ACT Inhaler    PROAIR HFA/PROVENTIL HFA/VENTOLIN HFA    1 Inhaler    Inhale 2 puffs into the lungs every 6 hours as needed for shortness of breath / dyspnea       ALPRAZolam 0.5 MG tablet    XANAX    2 tablet    Take 1 tablet (0.5 mg) by mouth 3 times daily as needed for anxiety       amitriptyline 25 MG tablet    ELAVIL    90 tablet    Take 3 tablets (75 mg) by mouth At Bedtime       buPROPion 150 MG 12 hr tablet    WELLBUTRIN SR    180 tablet    Take 1 tablet (150 mg) by mouth 2 times daily       calcium carbonate 500 MG chewable tablet    TUMS    100 tablet    Take 1 tablet (500 mg) by mouth 2 times daily       ciprofloxacin 250 MG tablet    CIPRO    10 tablet    Take 1 tablet (250 mg) by mouth 2 times daily for 5 days       cyclobenzaprine 5 MG tablet    FLEXERIL    20 tablet    Take 1 tablet (5 mg) by mouth nightly as needed for muscle spasms       fluticasone 50 MCG/ACT spray    FLONASE    16 g    Spray 1-2 sprays into both nostrils daily       methylcellulose 500 MG Tabs tablet    CITRUCEL    30 each    Take 1 tablet (500  mg) by mouth daily       mometasone-formoterol 100-5 MCG/ACT oral inhaler    DULERA    1 Inhaler    Inhale 2 puffs into the lungs 2 times daily       multivitamin, therapeutic with minerals Tabs tablet     100 tablet    Take 1 tablet by mouth daily       * order for DME     1 Device    Equipment being ordered: TENS       * order for DME     1 Device    Equipment being ordered: Back brace       oxymetazoline 0.05 % spray    AFRIN NASAL SPRAY    1 Bottle    Spray 2-3 sprays into both nostrils 2 times daily as needed for congestion       polyethylene glycol powder    MIRALAX    510 g    Take 17 g (1 capful) by mouth daily       prazosin 2 MG capsule    MINIPRESS    30 capsule    Take 1 capsule (2 mg) by mouth At Bedtime       predniSONE 20 MG tablet    DELTASONE    15 tablet    Take 3 tablets (60 mg) by mouth daily       * QUEtiapine 25 MG tablet    SEROQUEL    30 tablet    Take 0.5 tablets (12.5 mg) by mouth 4 times daily as needed For hallucinations and anger and irritability and anxiety       * QUEtiapine 300 MG tablet    SEROquel    90 tablet    Take 1 tablet (300 mg) by mouth At Bedtime       ranitidine 300 MG tablet    ZANTAC    30 tablet    Take 1 tablet (300 mg) by mouth At Bedtime       SUMAtriptan 100 MG tablet    IMITREX    18 tablet    Take 1 tablet (100 mg) by mouth at onset of headache for migraine May repeat dose in 2 hours.  Do not exceed 200 mg in 24 hours       vitamin D 2000 UNITS tablet     100 tablet    Take 2,000 Units by mouth daily       * Notice:  This list has 4 medication(s) that are the same as other medications prescribed for you. Read the directions carefully, and ask your doctor or other care provider to review them with you.

## 2017-05-10 NOTE — PROGRESS NOTES
SUBJECTIVE:                                                    Phil Mckoy is a 46 year old female who presents to clinic today for the following health issues:      Back Pain Follow Up       Description:   Location of pain:  Low back  Character of pain: sharp, dull ache, stabbing, gnawing, cramping and constant  Pain radiation: radiates into the right buttocks and radiates into the left buttocks  Since last visit, pain is:  unchanged and worsened  New numbness or weakness in legs, not attributed to pain:  YES- Right side of hip and leg    Intensity: Currently 8/10, severe    History:   Pain interferes with job: Not applicable,   Therapies tried without relief: Ask Patient  Therapies tried with relief: Nothing      Accompanying Signs & Symptoms:  Risk of Fracture:  None  Risk of Cauda Equina:  None  Risk of Infection:  None  Risk of Cancer:  None           Amount of exercise or physical activity: None    Problems taking medications regularly: No    Medication side effects: none    Diet: regular (no restrictions)          Problem list and histories reviewed & adjusted, as indicated.  Additional history: Patient notes that she had blood in her urine several times over the past 3 days which seems to have resolved.  PMHx + hysterectomy.       Reviewed and updated as needed this visit by clinical staff  Tobacco  Allergies  Meds  Med Hx  Surg Hx  Fam Hx  Soc Hx      Reviewed and updated as needed this visit by Provider         ROS:  Constitutional, HEENT, cardiovascular, pulmonary, gi and gu systems are negative, except as otherwise noted.    OBJECTIVE:                                                    /90  Pulse 80  Temp 99.2  F (37.3  C) (Oral)  Resp 14  Wt 164 lb 8 oz (74.6 kg)  SpO2 98%  BMI 31.08 kg/m2  Body mass index is 31.08 kg/(m^2).  GENERAL: healthy, alert and no distress  MS: no gross musculoskeletal defects noted, no edema  SKIN: no suspicious lesions or rashes  BACK: + Left sided  CVA tenderness, no paralumbar tenderness    Diagnostic Test Results:  Results for orders placed or performed in visit on 05/10/17 (from the past 24 hour(s))   *UA reflex to Microscopic and Culture ([place] [facility] (except [place] and [place])   Result Value Ref Range    Color Urine Yellow     Appearance Urine Clear     Glucose Urine Negative NEG mg/dL    Bilirubin Urine Negative NEG    Ketones Urine Trace (A) NEG mg/dL    Specific Gravity Urine 1.025 1.003 - 1.035    Blood Urine Trace (A) NEG    pH Urine 7.0 5.0 - 7.0 pH    Protein Albumin Urine 30 (A) NEG mg/dL    Urobilinogen Urine 0.2 0.2 - 1.0 EU/dL    Nitrite Urine Negative NEG    Leukocyte Esterase Urine Moderate (A) NEG    Source Midstream Urine    Urine Microscopic   Result Value Ref Range    WBC Urine  (A) 0 - 2 /HPF    RBC Urine 5-10 (A) 0 - 2 /HPF    Squamous Epithelial /LPF Urine Few FEW /LPF    Bacteria Urine Moderate (A) NEG /HPF     *Note: Due to a large number of results and/or encounters for the requested time period, some results have not been displayed. A complete set of results can be found in Results Review.        ASSESSMENT/PLAN:                                                    1. Urinary tract infection with hematuria, possible early pyelonephritis  - ciprofloxacin (CIPRO) 250 MG tablet; Take 1 tablet (250 mg) by mouth 2 times daily for 5 days  Dispense: 10 tablet; Refill: 0    2. Gross hematuria  - *UA reflex to Microscopic and Culture ([place] [facility] (except [place] and [place])  - Urine Microscopic    Use medication as directed.  Follow up on UC  Follow up per Sports Medicine  Follow up in 1 week, sooner if needed.  Patient amenable to this follow up plan.     Dagoberto Chua PA-C  [facility]      ADDENDUM: UC sensitivity shows Cipro resistance.  New script for Nitrofurantoin sent to [facility] pharmacy.  Joe VARGAS

## 2017-05-10 NOTE — NURSING NOTE
"Chief Complaint   Patient presents with     RECHECK       Initial /90  Pulse 80  Temp 99.2  F (37.3  C) (Oral)  Resp 14  Wt 164 lb 8 oz (74.6 kg)  SpO2 98%  BMI 31.08 kg/m2 Estimated body mass index is 31.08 kg/(m^2) as calculated from the following:    Height as of 5/5/17: 5' 1\" (1.549 m).    Weight as of this encounter: 164 lb 8 oz (74.6 kg).  Medication Reconciliation: complete       Alaina Montoya MA      "

## 2017-05-12 LAB
BACTERIA SPEC CULT: ABNORMAL
MICRO REPORT STATUS: ABNORMAL
MICROORGANISM SPEC CULT: ABNORMAL
SPECIMEN SOURCE: ABNORMAL

## 2017-05-12 RX ORDER — NITROFURANTOIN 25; 75 MG/1; MG/1
100 CAPSULE ORAL 2 TIMES DAILY
Qty: 14 CAPSULE | Refills: 0 | Status: SHIPPED | OUTPATIENT
Start: 2017-05-12 | End: 2018-05-02

## 2017-05-12 NOTE — PROGRESS NOTES
Pt notified that Rx for Cipro should be stopped and new Rx sent to [facility] pharmacy for Macrobid should be started as soon as she can.    Pt verbalized understanding and had no further questions.    Neftaly Flores RN

## 2017-05-15 ENCOUNTER — OFFICE VISIT (OUTPATIENT)
Dept: ORTHOPEDICS | Facility: CLINIC | Age: 47
End: 2017-05-15

## 2017-05-15 VITALS — HEIGHT: 61 IN | BODY MASS INDEX: 30.96 KG/M2 | WEIGHT: 164 LBS

## 2017-05-15 DIAGNOSIS — M25.561 PATELLOFEMORAL ARTHRALGIA OF RIGHT KNEE: Primary | ICD-10-CM

## 2017-05-15 DIAGNOSIS — M47.26 OSTEOARTHRITIS OF SPINE WITH RADICULOPATHY, LUMBAR REGION: ICD-10-CM

## 2017-05-15 RX ORDER — CYCLOBENZAPRINE HCL 5 MG
5 TABLET ORAL
Qty: 20 TABLET | Refills: 0 | Status: SHIPPED | OUTPATIENT
Start: 2017-05-15 | End: 2017-06-09

## 2017-05-15 NOTE — Clinical Note
Thank you for allowing me to see your patient in Sports Medicine Clinic.  Please see the attached copy of our visit.  Sincerely,  Reuben Varner MD

## 2017-05-15 NOTE — TELEPHONE ENCOUNTER
Routing refill request to provider for review/approval because:  Drug not on the [facility] refill protocol     Neftaly Flores RN
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
## 2017-05-15 NOTE — MR AVS SNAPSHOT
After Visit Summary   5/15/2017    Phil Mckoy    MRN: [id number]           Patient Information     Date Of Birth          1970        Visit Information        Provider Department      5/15/2017 2:00 PM Reuben Varner MD [facility] Sports Medicine        Today's Diagnoses     Patellofemoral arthralgia of right knee    -  1       Follow-ups after your visit        Additional Services     [facility] PT, HAND, AND CHIROPRACTIC REFERRAL       === This order will print in the [facility] Scheduling  Office ===    Physical therapy, hand therapy and chiropractic care are available through:    [facility]  [facility]    Call one easy number to schedule at any of the above locations:  [phone].    Your provider has referred you to Physical Therapy at [facility] or [facility] PT    Indication/Reason for Referral: Knee Pain  Onset of Illness:  Traumatic rt knee patellofemoral pain after fall directly onto ant knee one month ago.  Neg xray.  Neg rt knee MRI 2013.  Therapy Orders:  Evaluate and Treat  Special Programs:  None  Special Request:  None    Additional Comments for the therapist or chiropractor:  Could try tran taping, pelvifemoral alignment pgm.  6-8 visits    Please be aware that coverage of these services is subject to the terms and limitations of your health insurance plan.  Call member services at your health plan with any benefit or coverage questions.      Please bring the following to your appointment:    >>   Your personal calendar for scheduling future appointments  >>   Comfortable clothing                  Your next 10 appointments already scheduled     May 23, 2017  2:30 PM CDT   Return Visit with Dagoberto Chua PA-C   [facility] ([facility])    [address] [facility]  [address]  [facility] [address]   [phone]            May 23, 2017  2:30 PM CDT    Return Visit with Mitchel Lepe, [facility] ([facility])    [address]  [address]  [facility] [id number] [phone]            May 25, 2017  2:40 PM CDT   JAYY Extremity with Elliott Tang, PT   [facility] [place] ([facility]    [address]  [address]  [facility] [id number] [phone]            2017  3:50 PM CDT   JAYY Extremity with Gordon Mattson, PT   [facility] [place] ([facility])    [address]  [address]  [facility] [id number] [phone]              Who to contact     Please call your clinic at [phone] to:    Ask questions about your health    Make or cancel appointments    Discuss your medicines    Learn about your test results    Speak to your doctor   If you have compliments or concerns about an experience at your clinic, or if you wish to file a complaint, please contact [facility] Patient Relations at [phone] or email us at [email or web address]         Additional Information About Your Visit        MyChart Information     [employer] is an electronic gateway that provides easy, online access to your medical records. With [employer], you can request a clinic appointment, read your test results, renew a prescription or communicate with your care team.     To sign up for [employer] visit the website at [email or web address]   You will be asked to enter the access code listed below, as well as some personal information. Please follow the directions to create your username and password.     Your access code is: [email or web address]  Expires: 2017  6:30 AM     Your access code will  in 90 days. If you need help or a new code, please contact your [facility] or call [phone] for assistance.        Care EveryWhere ID   "   This is your Care EveryWhere ID. This could be used by other organizations to access your [place] medical records  [id number]        Your Vitals Were     Height BMI (Body Mass Index)                5' 1\" (1.549 m) 30.99 kg/m2           Blood Pressure from Last 3 Encounters:   05/10/17 140/90   05/05/17 110/72   03/29/17 122/81    Weight from Last 3 Encounters:   05/15/17 164 lb (74.4 kg)   05/10/17 164 lb 8 oz (74.6 kg)   05/05/17 162 lb (73.5 kg)              We Performed the Following     [facility] PT, HAND, AND CHIROPRACTIC REFERRAL          Where to get your medicines      These medications were sent to [facility] [address]  [address] [place] [facility] [address]     Phone:  [phone]     cyclobenzaprine 5 MG tablet          Primary Care Provider Office Phone # Fax #    Deja Cha [phone]        [facility] [address]  [facility] [address]        Thank you!     Thank you for choosing [facility]  for your care. Our goal is always to provide you with excellent care. Hearing back from our patients is one way we can continue to improve our services. Please take a few minutes to complete the written survey that you may receive in the mail after your visit with us. Thank you!             Your Updated Medication List - Protect others around you: Learn how to safely use, store and throw away your medicines at www.disposemymeds.org.          This list is accurate as of: 5/15/17 11:59 PM.  Always use your most recent med list.                   Brand Name Dispense Instructions for use    albuterol 108 (90 BASE) MCG/ACT Inhaler    PROAIR HFA/PROVENTIL HFA/VENTOLIN HFA    1 Inhaler    Inhale 2 puffs into the lungs every 6 hours as needed for shortness of breath / dyspnea       ALPRAZolam 0.5 MG tablet    XANAX    2 tablet    Take 1 tablet (0.5 mg) by mouth 3 times daily as needed for anxiety       " amitriptyline 25 MG tablet    ELAVIL    90 tablet    Take 3 tablets (75 mg) by mouth At Bedtime       buPROPion 150 MG 12 hr tablet    WELLBUTRIN SR    180 tablet    Take 1 tablet (150 mg) by mouth 2 times daily       calcium carbonate 500 MG chewable tablet    TUMS    100 tablet    Take 1 tablet (500 mg) by mouth 2 times daily       cyclobenzaprine 5 MG tablet    FLEXERIL    20 tablet    Take 1 tablet (5 mg) by mouth nightly as needed for muscle spasms       fluticasone 50 MCG/ACT spray    FLONASE    16 g    Spray 1-2 sprays into both nostrils daily       methylcellulose 500 MG Tabs tablet    CITRUCEL    30 each    Take 1 tablet (500 mg) by mouth daily       mometasone-formoterol 100-5 MCG/ACT oral inhaler    DULERA    1 Inhaler    Inhale 2 puffs into the lungs 2 times daily       multivitamin, therapeutic with minerals Tabs tablet     100 tablet    Take 1 tablet by mouth daily       nitrofurantoin (macrocrystal-monohydrate) 100 MG capsule    MACROBID    14 capsule    Take 1 capsule (100 mg) by mouth 2 times daily       * order for DME     1 Device    Equipment being ordered: TENS       * order for DME     1 Device    Equipment being ordered: Back brace       oxymetazoline 0.05 % spray    AFRIN NASAL SPRAY    1 Bottle    Spray 2-3 sprays into both nostrils 2 times daily as needed for congestion       polyethylene glycol powder    MIRALAX    510 g    Take 17 g (1 capful) by mouth daily       prazosin 2 MG capsule    MINIPRESS    30 capsule    Take 1 capsule (2 mg) by mouth At Bedtime       predniSONE 20 MG tablet    DELTASONE    15 tablet    Take 3 tablets (60 mg) by mouth daily       * QUEtiapine 25 MG tablet    SEROQUEL    30 tablet    Take 0.5 tablets (12.5 mg) by mouth 4 times daily as needed For hallucinations and anger and irritability and anxiety       * QUEtiapine 300 MG tablet    SEROquel    90 tablet    Take 1 tablet (300 mg) by mouth At Bedtime       ranitidine 300 MG tablet    ZANTAC    30 tablet    Take  1 tablet (300 mg) by mouth At Bedtime       SUMAtriptan 100 MG tablet    IMITREX    18 tablet    Take 1 tablet (100 mg) by mouth at onset of headache for migraine May repeat dose in 2 hours.  Do not exceed 200 mg in 24 hours       vitamin D 2000 UNITS tablet     100 tablet    Take 2,000 Units by mouth daily       * Notice:  This list has 4 medication(s) that are the same as other medications prescribed for you. Read the directions carefully, and ask your doctor or other care provider to review them with you.

## 2017-05-15 NOTE — PROGRESS NOTES
"Sports Medicine Clinic Visit    PCP: Deja Cha Ean is a 46 year old female who is seen  in consultation at the request of Dr. Cha presenting with right knee pain after a fall.     Injury: ~1 month        Ht 5' 1\" (1.549 m)  Wt 164 lb (74.4 kg)  BMI 30.99 kg/m2  "

## 2017-05-15 NOTE — TELEPHONE ENCOUNTER
Incoming call from pt requesting a refill     cyclobenzaprine (FLEXERIL) 5 MG      Last Written Prescription Date: 05/05/17  Last Fill Quantity: 20,  # refills: 0  Last Office Visit with [facility], [facility] or [facility] prescribing provider: 05/10/17                                         Next 5 appointments (look out 90 days)     May 17, 2017  2:30 PM CDT   Return Visit with Dagoberto Chua PA-C   [facility] ([facility])    [address] [place] [address]  [address]  [facility] [address] [phone]

## 2017-05-18 LAB — PHQ9 SCORE: 5

## 2017-05-23 ENCOUNTER — OFFICE VISIT (OUTPATIENT)
Dept: FAMILY MEDICINE | Facility: CLINIC | Age: 47
End: 2017-05-23
Payer: COMMERCIAL

## 2017-05-23 VITALS
HEART RATE: 94 BPM | DIASTOLIC BLOOD PRESSURE: 78 MMHG | WEIGHT: 160 LBS | BODY MASS INDEX: 30.21 KG/M2 | HEIGHT: 61 IN | RESPIRATION RATE: 16 BRPM | OXYGEN SATURATION: 95 % | TEMPERATURE: 99 F | SYSTOLIC BLOOD PRESSURE: 120 MMHG

## 2017-05-23 DIAGNOSIS — M54.16 LUMBAR RADICULOPATHY, RIGHT: Primary | ICD-10-CM

## 2017-05-23 DIAGNOSIS — G89.4 CHRONIC PAIN SYNDROME: ICD-10-CM

## 2017-05-23 DIAGNOSIS — F33.1 MAJOR DEPRESSIVE DISORDER, RECURRENT EPISODE, MODERATE (H): ICD-10-CM

## 2017-05-23 PROCEDURE — 99214 OFFICE O/P EST MOD 30 MIN: CPT | Performed by: PHYSICIAN ASSISTANT

## 2017-05-23 RX ORDER — KETOROLAC TROMETHAMINE 10 MG/1
10 TABLET, FILM COATED ORAL EVERY 6 HOURS PRN
Qty: 20 TABLET | Refills: 0 | Status: SHIPPED | OUTPATIENT
Start: 2017-05-23 | End: 2017-07-24

## 2017-05-23 RX ORDER — KETOROLAC TROMETHAMINE 30 MG/ML
30 INJECTION, SOLUTION INTRAMUSCULAR; INTRAVENOUS ONCE
Qty: 1 ML | Refills: 0 | OUTPATIENT
Start: 2017-05-23 | End: 2017-05-23

## 2017-05-23 NOTE — MR AVS SNAPSHOT
After Visit Summary   5/23/2017    Phil Mckoy    MRN: [id number]           Patient Information     Date Of Birth          1970        Visit Information        Provider Department      5/23/2017 2:30 PM Dagoberto Chua PA-C [facility]        Today's Diagnoses     Lumbar radiculopathy, right    -  1    Chronic pain syndrome        Major depressive disorder, recurrent episode, moderate (H)           Follow-ups after your visit        Additional Services     ORTHO  REFERRAL       [facility] is referring you to the Orthopedic  Services at [facility].       The  Representative will assist you in the coordination of your Orthopedic and Musculoskeletal Care as prescribed by your physician.    The  Representative will call you within 1 business day to help schedule your appointment, or you may contact the  Representative at:    All areas ~ [phone]     Type of Referral : Spine: Lumbar  **Choose Medical Spine Specialist (unless patient was seen by a Medical Spine Specialist within the past 6 months).**  Surgical Evaluation is advised if the patient presents with one or more of the following red flags: Evidence of Spinal Tumor, Infection or Fracture, Cauda Equina Syndrome, Sudden or Progressive Weakness, Loss of Bowel or Bladder Control, or any other documented emergent neurological condition resulting from a Lumbar Spinal Condition. Spine Surgeon        Timeframe requested: Within 2 weeks    Coverage of these services is subject to the terms and limitations of your health insurance plan.  Please call member services at your health plan with any benefit or coverage questions.      If X-rays, CT or MRI's have been performed, please contact the facility where they were done to arrange for , prior to your scheduled appointment.  Please bring this referral request to  your appointment and present it to your specialist.                  Your next 10 appointments already scheduled     May 25, 2017  2:40 PM CDT   JAYY Extremity with Elliott Tang, PT   [facility]    [address] [place] [address]  [address] [place] [id number] [phone]            Jun 02, 2017  3:50 PM CDT   JAYY Extremity with Gordon Mattson, PT   [facility] [place] ( [facility])    [address] [place] [address]  [address] [address] [phone]            Jun 08, 2017  2:30 PM CDT   Return Visit with Dagoberto Chua PA-C   [facility] ([facility]    [address] [place] [address]  [address]  [facility] [id number] [phone]              Future tests that were ordered for you today     Open Future Orders        Priority Expected Expires Ordered    MR Lumbar Spine w/o Contrast Routine  5/23/2018 5/23/2017            Who to contact     If you have questions or need follow up information about today's clinic visit or your schedule please contact [facility] directly at [phone].  Normal or non-critical lab and imaging results will be communicated to you by MyChart, letter or phone within 4 business days after the clinic has received the results. If you do not hear from us within 7 days, please contact the clinic through [employer] or phone. If you have a critical or abnormal lab result, we will notify you by phone as soon as possible.  Submit refill requests through [facility] or call your pharmacy and they will forward the refill request to us. Please allow 3 business days for your refill to be completed.          Additional Information About Your Visit        [facility] Information     [employer] lets you send messages to your doctor, view your test results, renew your prescriptions, schedule appointments and more. To  "sign up, go to [email or web address] . Click on \"Log in\" on the left side of the screen, which will take you to the Welcome page. Then click on \"Sign up Now\" on the right side of the page.     You will be asked to enter the access code listed below, as well as some personal information. Please follow the directions to create your username and password.     Your access code is: [email or web address]  Expires: 2017  6:30 AM     Your access code will  in 90 days. If you need help or a new code, please call your [place] clinic or [phone].        Care EveryWhere ID     This is your Care EveryWhere ID. This could be used by other organizations to access your [place] medical records  [id number]        Your Vitals Were     Pulse Temperature Respirations Height Pulse Oximetry BMI (Body Mass Index)    94 99  F (37.2  C) (Oral) 16 5' 1\" (1.549 m) 95% 30.23 kg/m2       Blood Pressure from Last 3 Encounters:   17 120/78   05/10/17 140/90   17 110/72    Weight from Last 3 Encounters:   17 160 lb (72.6 kg)   05/15/17 164 lb (74.4 kg)   05/10/17 164 lb 8 oz (74.6 kg)              We Performed the Following     ORTHO  REFERRAL          Today's Medication Changes          These changes are accurate as of: 17  2:51 PM.  If you have any questions, ask your nurse or doctor.               Start taking these medicines.        Dose/Directions    * ketorolac 30 MG/ML injection   Commonly known as:  TORADOL   Used for:  Lumbar radiculopathy, right   Started by:  Dagoberto Chua PA-C        Dose:  30 mg   Inject 1 mL (30 mg) into the muscle once for 1 dose   Quantity:  1 mL   Refills:  0       * ketorolac 10 MG tablet   Commonly known as:  TORADOL   Used for:  Lumbar radiculopathy, right   Started by:  Dagoberto Chua PA-C        Dose:  10 mg   Take 1 tablet (10 mg) by mouth every 6 hours as needed for pain   Quantity:  20 tablet   Refills:  0       * Notice:  This list has 2 " medication(s) that are the same as other medications prescribed for you. Read the directions carefully, and ask your doctor or other care provider to review them with you.      Stop taking these medicines if you haven't already. Please contact your care team if you have questions.     QUEtiapine 25 MG tablet   Commonly known as:  SEROQUEL   Stopped by:  Dagoberto Chua PA-C           QUEtiapine 300 MG tablet   Commonly known as:  SEROquel   Stopped by:  Dagoberto Chua PA-C                Where to get your medicines      Some of these will need a paper prescription and others can be bought over the counter.  Ask your nurse if you have questions.     Bring a paper prescription for each of these medications     ketorolac 10 MG tablet       You don't need a prescription for these medications     ketorolac 30 MG/ML injection                Primary Care Provider    None Specified       No primary provider on file.        Thank you!     Thank you for choosing [facility]  for your care. Our goal is always to provide you with excellent care. Hearing back from our patients is one way we can continue to improve our services. Please take a few minutes to complete the written survey that you may receive in the mail after your visit with us. Thank you!             Your Updated Medication List - Protect others around you: Learn how to safely use, store and throw away your medicines at www.disposemymeds.org.          This list is accurate as of: 5/23/17  2:51 PM.  Always use your most recent med list.                   Brand Name Dispense Instructions for use    albuterol 108 (90 BASE) MCG/ACT Inhaler    PROAIR HFA/PROVENTIL HFA/VENTOLIN HFA    1 Inhaler    Inhale 2 puffs into the lungs every 6 hours as needed for shortness of breath / dyspnea       ALPRAZolam 0.5 MG tablet    XANAX    2 tablet    Take 1 tablet (0.5 mg) by mouth 3 times daily as needed for anxiety       amitriptyline  25 MG tablet    ELAVIL    90 tablet    Take 3 tablets (75 mg) by mouth At Bedtime       buPROPion 150 MG 12 hr tablet    WELLBUTRIN SR    180 tablet    Take 1 tablet (150 mg) by mouth 2 times daily       calcium carbonate 500 MG chewable tablet    TUMS    100 tablet    Take 1 tablet (500 mg) by mouth 2 times daily       cyclobenzaprine 5 MG tablet    FLEXERIL    20 tablet    Take 1 tablet (5 mg) by mouth nightly as needed for muscle spasms       fluticasone 50 MCG/ACT spray    FLONASE    16 g    Spray 1-2 sprays into both nostrils daily       * ketorolac 30 MG/ML injection    TORADOL    1 mL    Inject 1 mL (30 mg) into the muscle once for 1 dose       * ketorolac 10 MG tablet    TORADOL    20 tablet    Take 1 tablet (10 mg) by mouth every 6 hours as needed for pain       methylcellulose 500 MG Tabs tablet    CITRUCEL    30 each    Take 1 tablet (500 mg) by mouth daily       mometasone-formoterol 100-5 MCG/ACT oral inhaler    DULERA    1 Inhaler    Inhale 2 puffs into the lungs 2 times daily       multivitamin, therapeutic with minerals Tabs tablet     100 tablet    Take 1 tablet by mouth daily       nitrofurantoin (macrocrystal-monohydrate) 100 MG capsule    MACROBID    14 capsule    Take 1 capsule (100 mg) by mouth 2 times daily       * order for DME     1 Device    Equipment being ordered: TENS       * order for DME     1 Device    Equipment being ordered: Back brace       oxymetazoline 0.05 % spray    AFRIN NASAL SPRAY    1 Bottle    Spray 2-3 sprays into both nostrils 2 times daily as needed for congestion       polyethylene glycol powder    MIRALAX    510 g    Take 17 g (1 capful) by mouth daily       prazosin 2 MG capsule    MINIPRESS    30 capsule    Take 1 capsule (2 mg) by mouth At Bedtime       predniSONE 20 MG tablet    DELTASONE    15 tablet    Take 3 tablets (60 mg) by mouth daily       ranitidine 300 MG tablet    ZANTAC    30 tablet    Take 1 tablet (300 mg) by mouth At Bedtime       SUMAtriptan 100 MG  tablet    IMITREX    18 tablet    Take 1 tablet (100 mg) by mouth at onset of headache for migraine May repeat dose in 2 hours.  Do not exceed 200 mg in 24 hours       vitamin D 2000 UNITS tablet     100 tablet    Take 2,000 Units by mouth daily       * Notice:  This list has 4 medication(s) that are the same as other medications prescribed for you. Read the directions carefully, and ask your doctor or other care provider to review them with you.

## 2017-05-23 NOTE — PROGRESS NOTES
"  SUBJECTIVE:                                                    Phil Mckoy is a 46 year old female who presents to clinic today for the following health issues:      Back Pain Follow Up       Description:   Location of pain:  right  Character of pain: cramping  Pain radiation: radiates into the right buttocks, hip and leg   Since last visit, pain is:  unchanged  New numbness or weakness in legs, not attributed to pain:  no     Intensity: Currently  7/10    History:   Pain interferes with job: Not applicable  Therapies tried without relief: pool therapy, physical therapy , cold, heat and muscle relaxants  Therapies tried with relief: None         Accompanying Signs & Symptoms:  Risk of Fracture:  None  Risk of Cauda Equina:  None  Risk of Infection:  None  Risk of Cancer:  None           Amount of exercise or physical activity:  Every day     Problems taking medications regularly: No    Medication side effects: none    Diet: regular (no restrictions)        Problem list and histories reviewed & adjusted, as indicated.  Additional history: Reviewed recommendations from sports medicine.  Patient will present for Physical Therapy despite failing one course previously.  Feels better after UTI treatment but notes that her LBP and R sided leg weakness is worsening from this time last year.  Still intermittently feels unstable.  Tries using a cane but doesn't feel this is successful.  Has recently seen psychiatry and will call us with her recent medication changes.  She is starting a new sleep aid but is uncertain if it will be effective due to the increase in LBP when she lays down.        ROS:  Constitutional, HEENT, cardiovascular, pulmonary, gi and gu systems are negative, except as otherwise noted.    OBJECTIVE:                                                    /78  Pulse 94  Temp 99  F (37.2  C) (Oral)  Resp 16  Ht 5' 1\" (1.549 m)  Wt 160 lb (72.6 kg)  SpO2 95%  BMI 30.23 kg/m2  Body mass index " is 30.23 kg/(m^2).    Unchanged since 5/5/2017.    Diagnostic Test Results:  none      ASSESSMENT/PLAN:                                                    1. Lumbar radiculopathy, right  - ketorolac (TORADOL) 30 MG/ML injection; Inject 1 mL (30 mg) into the muscle once for 1 dose  Dispense: 1 mL; Refill: 0  - ketorolac (TORADOL) 10 MG tablet; Take 1 tablet (10 mg) by mouth every 6 hours as needed for pain  Dispense: 20 tablet; Refill: 0  - MR Lumbar Spine w/o Contrast; Future  - ORTHO  REFERRAL    2. Chronic pain syndrome      3. Major depressive disorder, recurrent episode, moderate (H)  -follow up per Psychiatry  - Call to review and update medication list.       Use medication as directed.  Follow up per Ortho  Follow up per Psychiatry  Follow up per Physical Therapy   Follow up 2 weeks after Ortho consult, sooner if needed.  Patient amenable to this follow up plan.     Dagoberto Chua PA-C  [facility] PRIMARY [facility]ARE

## 2017-05-23 NOTE — NURSING NOTE
"Chief Complaint   Patient presents with     Referral     Surgery for back      Pain       Initial /78  Pulse 94  Temp 99  F (37.2  C) (Oral)  Resp 16  Ht 5' 1\" (1.549 m)  Wt 160 lb (72.6 kg)  SpO2 95%  BMI 30.23 kg/m2 Estimated body mass index is 30.23 kg/(m^2) as calculated from the following:    Height as of this encounter: 5' 1\" (1.549 m).    Weight as of this encounter: 160 lb (72.6 kg).  Medication Reconciliation: complete   Marly Agustin MA      "

## 2017-05-26 ENCOUNTER — APPOINTMENT (OUTPATIENT)
Dept: GENERAL RADIOLOGY | Facility: CLINIC | Age: 47
End: 2017-05-26
Attending: EMERGENCY MEDICINE
Payer: COMMERCIAL

## 2017-05-26 ENCOUNTER — HOSPITAL ENCOUNTER (EMERGENCY)
Facility: CLINIC | Age: 47
Discharge: HOME | End: 2017-05-26
Attending: EMERGENCY MEDICINE | Admitting: EMERGENCY MEDICINE
Payer: COMMERCIAL

## 2017-05-26 VITALS — HEART RATE: 82 BPM | OXYGEN SATURATION: 99 % | RESPIRATION RATE: 16 BRPM

## 2017-05-26 DIAGNOSIS — M79.675 PAIN OF TOE OF LEFT FOOT: ICD-10-CM

## 2017-05-26 PROCEDURE — 99283 EMERGENCY DEPT VISIT LOW MDM: CPT | Performed by: EMERGENCY MEDICINE

## 2017-05-26 PROCEDURE — 73630 X-RAY EXAM OF FOOT: CPT | Mod: LT

## 2017-05-26 PROCEDURE — 99282 EMERGENCY DEPT VISIT SF MDM: CPT | Mod: Z6 | Performed by: EMERGENCY MEDICINE

## 2017-05-26 NOTE — DISCHARGE INSTRUCTIONS
Toe Sprain  A sprain is a stretching or tearing of the ligaments that hold a joint together. There are no broken bones. Sprains generally take from 3-6 weeks to heal. A toe sprain may be treated by taping the injured toe to the next toe. This is called buddy taping. This protects the injured toe and holds it in position. Mild sprains may not need any additional support. If the toenail has been hurt badly, it may fall off in 1-2 weeks. A new one will usually start to grow back within a month.    Home care    Keep your leg elevated when sitting or lying down. This is very important during the first 48 hours to reduce swelling. Stay off the injured foot as much as possible until you can walk on it without pain. If needed, you may use crutches during the first week for this purpose. Crutches can be rented at many pharmacies or surgical/orthopedic supply stores.    You may be given a cast shoe to wear to prevent movement in your toe. If not, you can use a sandal or any shoe that does not put pressure on the injured toe until the swelling and pain go away. If using a sandal, be careful not to hit your foot against anything, since another injury could make the sprain worse.    Apply an ice pack over the injured area for 15 to 20 minutes every 3 to 6 hours. You should do this for the first 24 to 48 hours. You can make an ice pack by filling a plastic bag that seals at the top with ice cubes and then wrapping it with a thin towel. Continue to use ice packs for relief of pain and swelling as needed. As the ice melts, be careful to avoid getting your wrap, splint, or cast wet. As the ice melts, be careful to avoid getting any wrap, splint, tape, or cast wet. After 48 hours, apply heat from a warm shower or bath for 20 minutes several times daily. Alternating ice and heat may also be helpful.    If buddy tape was applied and it becomes wet or dirty, change it. You may replace it with paper, plastic or cloth tape. Cloth tape  and paper tapes must be kept dry. Apply gauze or cotton padding between the toes, especially near webbed area. This will help prevent the skin from getting moist and breaking down. Keep the yuridia tape in place for at least 4 weeks, or as advised by your healthcare provider.    You may use over-the-counter pain medicine to control pain, unless another pain medicine was prescribed. If you have chronic liver or kidney disease or ever had a stomach ulcer or GI bleeding, talk with your healthcare provider before using these medicines.    You may return to sports after healing, when you can run without pain.  Follow-up care  Follow up with your with your healthcare provider as advised. Sometimes fractures don t show up on the first X-ray. Bruises and sprains can sometimes hurt as much as a fracture. These injuries can take time to heal completely. If your symptoms don t improve or they get worse, talk with your healthcare provider. You may need a repeat X-ray. If X-rays were taken, you will be told of any new findings that may affect your care.  When to seek medical advice  Call your healthcare provider right away if any of these occur:    Redness, warmth, or fluid drainage from your toe    Pain or swelling increases    Toes become cold, blue, numb, or tingly    7164-0504 [employer]. [address] [place] [address], [place] [address]. All rights reserved. This information is not intended as a substitute for professional medical care. Always follow your healthcare professional's instructions.

## 2017-05-26 NOTE — ED AVS SNAPSHOT
[facility], Emergency Department    [address] [facility] [address]    Phone:  [phone]                                       Phil Mckoy   MRN: [id number]    Department:  [facility], Emergency Department   Date of Visit:  5/26/2017           Patient Information     Date Of Birth          1970        Your diagnoses for this visit were:     Pain of toe of left foot        You were seen by Avila Gonzalez MD.        Discharge Instructions           Toe Sprain  A sprain is a stretching or tearing of the ligaments that hold a joint together. There are no broken bones. Sprains generally take from 3-6 weeks to heal. A toe sprain may be treated by taping the injured toe to the next toe. This is called yuridia taping. This protects the injured toe and holds it in position. Mild sprains may not need any additional support. If the toenail has been hurt badly, it may fall off in 1-2 weeks. A new one will usually start to grow back within a month.    Home care    Keep your leg elevated when sitting or lying down. This is very important during the first 48 hours to reduce swelling. Stay off the injured foot as much as possible until you can walk on it without pain. If needed, you may use crutches during the first week for this purpose. Crutches can be rented at many pharmacies or surgical/orthopedic supply stores.    You may be given a cast shoe to wear to prevent movement in your toe. If not, you can use a sandal or any shoe that does not put pressure on the injured toe until the swelling and pain go away. If using a sandal, be careful not to hit your foot against anything, since another injury could make the sprain worse.    Apply an ice pack over the injured area for 15 to 20 minutes every 3 to 6 hours. You should do this for the first 24 to 48 hours. You can make an ice pack by filling a plastic bag that seals at the top with ice cubes and then wrapping it with a thin towel. Continue to use  ice packs for relief of pain and swelling as needed. As the ice melts, be careful to avoid getting your wrap, splint, or cast wet. As the ice melts, be careful to avoid getting any wrap, splint, tape, or cast wet. After 48 hours, apply heat from a warm shower or bath for 20 minutes several times daily. Alternating ice and heat may also be helpful.    If buddy tape was applied and it becomes wet or dirty, change it. You may replace it with paper, plastic or cloth tape. Cloth tape and paper tapes must be kept dry. Apply gauze or cotton padding between the toes, especially near webbed area. This will help prevent the skin from getting moist and breaking down. Keep the buddy tape in place for at least 4 weeks, or as advised by your healthcare provider.    You may use over-the-counter pain medicine to control pain, unless another pain medicine was prescribed. If you have chronic liver or kidney disease or ever had a stomach ulcer or GI bleeding, talk with your healthcare provider before using these medicines.    You may return to sports after healing, when you can run without pain.  Follow-up care  Follow up with your with your healthcare provider as advised. Sometimes fractures don t show up on the first X-ray. Bruises and sprains can sometimes hurt as much as a fracture. These injuries can take time to heal completely. If your symptoms don t improve or they get worse, talk with your healthcare provider. You may need a repeat X-ray. If X-rays were taken, you will be told of any new findings that may affect your care.  When to seek medical advice  Call your healthcare provider right away if any of these occur:    Redness, warmth, or fluid drainage from your toe    Pain or swelling increases    Toes become cold, blue, numb, or tingly    8558-5401 [employer]. [address] [place] [address], [place] [address]. All rights reserved. This information is not intended as a substitute for professional medical care. Always  follow your healthcare professional's instructions.            Future Appointments        Provider Department Dept Phone Center    5/27/2017 3:15 PM [facility] MRI ROOM 1 [facility] [phone] [facility]    6/2/2017 3:50 PM Gordon Mattson, PT [facility] [phone] PT    6/21/2017 1:00 PM EFREN Ponce [facility] [phone] [facility]    6/21/2017 1:00 PM Dagoberto Chua PA-C [facility] [phone] [facility]      24 Hour Appointment Hotline       To make an appointment at any [facility], call [phone] ([phone]). If you don't have a family doctor or clinic, we will help you find one. [facility] are conveniently located to serve the needs of you and your family.             Review of your medicines      Our records show that you are taking the medicines listed below. If these are incorrect, please call your family doctor or clinic.        Dose / Directions Last dose taken    albuterol 108 (90 BASE) MCG/ACT Inhaler   Commonly known as:  PROAIR HFA/PROVENTIL HFA/VENTOLIN HFA   Dose:  2 puff   Quantity:  1 Inhaler        Inhale 2 puffs into the lungs every 6 hours as needed for shortness of breath / dyspnea   Refills:  1        ALPRAZolam 0.5 MG tablet   Commonly known as:  XANAX   Dose:  0.5 mg   Quantity:  2 tablet        Take 1 tablet (0.5 mg) by mouth 3 times daily as needed for anxiety   Refills:  0        amitriptyline 25 MG tablet   Commonly known as:  ELAVIL   Dose:  75 mg   Quantity:  90 tablet        Take 3 tablets (75 mg) by mouth At Bedtime   Refills:  2        buPROPion 150 MG 12 hr tablet   Commonly known as:  WELLBUTRIN SR   Dose:  150 mg   Quantity:  180 tablet        Take 1 tablet (150 mg) by mouth 2 times daily   Refills:  1        calcium carbonate 500 MG chewable tablet   Commonly known as:  TUMS   Dose:  1 chew tab   Quantity:  100 tablet         Take 1 tablet (500 mg) by mouth 2 times daily   Refills:  3        cyclobenzaprine 5 MG tablet   Commonly known as:  FLEXERIL   Dose:  5 mg   Quantity:  20 tablet        Take 1 tablet (5 mg) by mouth nightly as needed for muscle spasms   Refills:  0        fluticasone 50 MCG/ACT spray   Commonly known as:  FLONASE   Dose:  1-2 spray   Quantity:  16 g        Spray 1-2 sprays into both nostrils daily   Refills:  2        ketorolac 10 MG tablet   Commonly known as:  TORADOL   Dose:  10 mg   Quantity:  20 tablet        Take 1 tablet (10 mg) by mouth every 6 hours as needed for pain   Refills:  0        methylcellulose 500 MG Tabs tablet   Commonly known as:  CITRUCEL   Dose:  500 mg   Quantity:  30 each        Take 1 tablet (500 mg) by mouth daily   Refills:  3        mometasone-formoterol 100-5 MCG/ACT oral inhaler   Commonly known as:  DULERA   Dose:  2 puff   Quantity:  1 Inhaler        Inhale 2 puffs into the lungs 2 times daily   Refills:  3        multivitamin, therapeutic with minerals Tabs tablet   Dose:  1 tablet   Quantity:  100 tablet        Take 1 tablet by mouth daily   Refills:  3        nitrofurantoin (macrocrystal-monohydrate) 100 MG capsule   Commonly known as:  MACROBID   Dose:  100 mg   Quantity:  14 capsule        Take 1 capsule (100 mg) by mouth 2 times daily   Refills:  0        * order for DME   Quantity:  1 Device        Equipment being ordered: TENS   Refills:  0        * order for DME   Quantity:  1 Device        Equipment being ordered: Back brace   Refills:  0        oxymetazoline 0.05 % spray   Commonly known as:  AFRIN NASAL SPRAY   Dose:  2-3 spray   Quantity:  1 Bottle        Spray 2-3 sprays into both nostrils 2 times daily as needed for congestion   Refills:  0        polyethylene glycol powder   Commonly known as:  MIRALAX   Dose:  1 capful   Quantity:  510 g        Take 17 g (1 capful) by mouth daily   Refills:  11        prazosin 2 MG capsule   Commonly known as:  MINIPRESS   Dose:   2 mg   Quantity:  30 capsule        Take 1 capsule (2 mg) by mouth At Bedtime   Refills:  2        predniSONE 20 MG tablet   Commonly known as:  DELTASONE   Dose:  60 mg   Quantity:  15 tablet        Take 3 tablets (60 mg) by mouth daily   Refills:  0        ranitidine 300 MG tablet   Commonly known as:  ZANTAC   Dose:  300 mg   Quantity:  30 tablet        Take 1 tablet (300 mg) by mouth At Bedtime   Refills:  3        SUMAtriptan 100 MG tablet   Commonly known as:  IMITREX   Dose:  100 mg   Quantity:  18 tablet        Take 1 tablet (100 mg) by mouth at onset of headache for migraine May repeat dose in 2 hours.  Do not exceed 200 mg in 24 hours   Refills:  3        vitamin D 2000 UNITS tablet   Dose:  2000 Units   Quantity:  100 tablet        Take 2,000 Units by mouth daily   Refills:  3        * Notice:  This list has 2 medication(s) that are the same as other medications prescribed for you. Read the directions carefully, and ask your doctor or other care provider to review them with you.            Procedures and tests performed during your visit     XR Foot Left G/E 3 Views      Orders Needing Specimen Collection     None      Pending Results     Date and Time Order Name Status Description    5/26/2017 1732 XR Foot Left G/E 3 Views Preliminary             Pending Culture Results     No orders found from 5/24/2017 to 5/27/2017.            Pending Results Instructions     If you had any lab results that were not finalized at the time of your Discharge, you can call the ED Lab Result RN at [phone]. You will be contacted by this team for any positive Lab results or changes in treatment. The nurses are available 7 days a week from 10A to 6:30P.  You can leave a message 24 hours per day and they will return your call.        Thank you for choosing [facility]       Thank you for choosing [facility] for your care. Our goal is always to provide you with excellent care. Hearing back from our patients is one way we can  "continue to improve our services. Please take a few minutes to complete the written survey that you may receive in the mail after you visit with us. Thank you!        [employer] [facility] Information     [employer] lets you send messages to your doctor, view your test results, renew your prescriptions, schedule appointments and more. To sign up, go to [email or web address] . Click on \"Log in\" on the left side of the screen, which will take you to the Welcome page. Then click on \"Sign up Now\" on the right side of the page.     You will be asked to enter the access code listed below, as well as some personal information. Please follow the directions to create your username and password.     Your access code is: [email or web address]  Expires: 2017  6:30 AM     Your access code will  in 90 days. If you need help or a new code, please call your [facility] clinic or [phone].        Care EveryWhere ID     This is your Care EveryWhere ID. This could be used by other organizations to access your [facility] medical records  [id number]        After Visit Summary       This is your record. Keep this with you and show to your community pharmacist(s) and doctor(s) at your next visit.                  "

## 2017-05-31 ENCOUNTER — CARE COORDINATION (OUTPATIENT)
Dept: FAMILY MEDICINE | Facility: CLINIC | Age: 47
End: 2017-05-31

## 2017-05-31 NOTE — PROGRESS NOTES
Clinic Care Coordination Contact  [facility]/Voicemail    Referral Source: PCP  Clinical Data: Care Coordinator Outreach for hospital follow up call. It appears she was brought to ED via ambulance but there is no MD note.   Outreach attempted x 1.  Left message on voicemail with call back information and requested return call.  Plan: Care Coordinator will mail out care coordination introduction letter with care coordinator contact information and explanation of care coordination services. Care Coordinator will try to reach patient again in 1-2 business days.    Harleen Elise R.N.  Clinic Care Coordinator  [facility]  [phone]

## 2017-05-31 NOTE — LETTER
[address]  [facility] [address]  Phone: [phone]  Fax: [phone]      May 31, 2017      Phil Mckoy  [address] [facility] [address]  [place] [facility] [address]    Dear Phil,  I am the Clinic Care Coordinator that works with your primary care provider's clinic. I wanted to introduce myself and provide you with my contact information for you to be able to call me with any questions or concerns. Below is a description of what Clinic Care Coordination is and how I can further assist you.     The Clinic Care Coordinator role is a Registered Nurse and/or  who understands the health care system. The goal of Clinic Care Coordination is to help you manage your health and improve access to the [facility] system in the most efficient manner.  The Registered Nurse can assist you in meeting your health care goals by providing education, coordinating services, and strengthening the communication among your providers. The  can assist you with financial, behavioral, psychosocial, and chemical dependency and counseling/psychiatric resources.    Please feel free to keep this letter and contact information to contact me at [phone] with any further questions or concerns that may arise. We at [facility] are focused on providing you with the highest-quality healthcare experience possible and that all starts with you.       Sincerely,     Eleanor Elise    Enclosed: I have enclosed a copy of a 24 Hour Access Plan. This has helpful phone numbers for you to call when needed. Please keep this in an easy to access place to use as needed.
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
## 2017-06-02 NOTE — PROGRESS NOTES
Clinic Care Coordination Contact  [facility]/Voicemail    Referral Source: PCP  Clinical Data: Care Coordinator Outreach  Outreach attempted x 2.  Left message on voicemail with call back information and requested return call.  Plan:  Care Coordinator will do no further outreaches at this time.    Harleen Elise R.N.  Clinic Care Coordinator  [facility]  [phone]

## 2017-06-09 DIAGNOSIS — M47.26 OSTEOARTHRITIS OF SPINE WITH RADICULOPATHY, LUMBAR REGION: ICD-10-CM

## 2017-06-09 RX ORDER — CYCLOBENZAPRINE HCL 5 MG
5 TABLET ORAL
Qty: 20 TABLET | Refills: 0 | Status: SHIPPED | OUTPATIENT
Start: 2017-06-09 | End: 2017-06-27

## 2017-06-09 NOTE — TELEPHONE ENCOUNTER
Flexeril      Last Written Prescription Date:  5/15/2017  Last Fill Quantity: 20,   # refills: 0  Last Office Visit with [facility], [facility] or [facility] prescribing provider: 5/27/2017  Future Office visit:    Next 5 appointments (look out 90 days)     Jun 29, 2017  2:30 PM CDT   Return Visit with EFREN Oro   [facility] ([facility])    [address]  [address]  [facility] [address] [phone]            Jun 29, 2017  2:30 PM CDT   Return Visit with Dagoberto Chua PA-C   [facility] ([facility])    [address]  [address]  [facility] [address] [phone]                   Routing refill request to provider for review/approval because:  Drug not on the [facility], [facility] or [facility] refill protocol or controlled substance    Anirudh Jama, Pharm.D.  [facility]  [phone]

## 2017-06-13 ENCOUNTER — TELEPHONE (OUTPATIENT)
Dept: FAMILY MEDICINE | Facility: CLINIC | Age: 47
End: 2017-06-13

## 2017-06-13 NOTE — TELEPHONE ENCOUNTER
Pt stated that her psychiatrist wants her to take Xanax three times per day.  Writer informed Pt that she needs to call him to order it then.  Pt verbalized understanding.    Pt also stated that she has back pain.  She was in the office today and had an Appt but waited too long and left.      Writer informed pt that she would need an Appt to evaluate and Pt only wants to see a Dr.    Pt scheduled next available with Dr. Hyman.    Neftaly Flores RN

## 2017-06-13 NOTE — TELEPHONE ENCOUNTER
Reason for Call:  Medication or medication refill: REFILL     Do you use a [facility]?  Name of the pharmacy and phone number for the current request:  [facility] - [phone]    Name of the medication requested: XANEX    Other request: NA    Can we leave a detailed message on this number? YES    Phone number patient can be reached at: Home number on file [phone] (home)    Best Time: ANYTIME    Call taken on 6/13/2017 at 1:42 PM by Terri Dougherty

## 2017-06-14 ASSESSMENT — ENCOUNTER SYMPTOMS: JOINT SWELLING: 1

## 2017-06-15 ENCOUNTER — TRANSFERRED RECORDS (OUTPATIENT)
Dept: HEALTH INFORMATION MANAGEMENT | Facility: CLINIC | Age: 47
End: 2017-06-15

## 2017-06-15 NOTE — ED PROVIDER NOTES
History     Chief Complaint   Patient presents with     Toe Pain     HPI  Phil Mckoy is a 46 year old female who states that she had dropped a heavy object onto her foot/toe a few hours PTA and now with increase in pain. No numbness, tingling or weakness. Tender to touch and flex/extension but still able to bear weight. No other complaints or injuries.    I have reviewed the Medications, Allergies, Past Medical and Surgical History, and Social History in the Epic system.    Review of Systems   Musculoskeletal: Positive for joint swelling.   All other systems reviewed and are negative.      Physical Exam   Pulse: 82  Resp: 16  SpO2: 99 %  Physical Exam Exam:  Constitutional: healthy, alert and no distress  Head: Normocephalic. No masses, lesions, tenderness or abnormalities  Neck: Neck supple. No adenopathy. Thyroid symmetric, normal size,, Carotids without bruits.  ENT: ENT exam normal, no neck nodes or sinus tenderness  Cardiovascular: negative, PMI normal. No lifts, heaves, or thrills. RRR. No murmurs, clicks gallops or rub  Respiratory: negative, Percussion normal. Good diaphragmatic excursion. Lungs clear  Gastrointestinal: Abdomen soft, non-tender. BS normal. No masses, organomegaly  : Deferred  Musculoskeletal: Toe: tender of L foot over 4th digit otherwise extremities normal- no gross deformities noted, gait normal and normal muscle tone  Skin: no suspicious lesions or rashes  Neurologic: Gait normal. Reflexes normal and symmetric. Sensation grossly WNL.  Psychiatric: mentation appears normal and affect normal/bright  Hematologic/Lymphatic/Immunologic: Normal cervical lymph nodes      ED Course     ED Course     Procedures        EXAM: XR FOOT LT G/E 3 VW  5/26/2017 6:06 PM      HISTORY: Trauma    COMPARISON: None    FINDINGS: 3 views of the left foot . No focal fracture. No significant  soft tissue swelling. Joint alignment is normal limits. Groundglass  appearing lesion in the fourth proximal  phalanx.          Labs Ordered and Resulted from Time of ED Arrival Up to the Time of Departure from the ED - No data to display         Assessments & Plan (with Medical Decision Making)   46F here with toe pain after injury and states that pain has been persistent. No evidence of fx on xray; will d/c home with conservative pain tx and f/u with PCP. Quinton tape applied for comfort.    I have reviewed the nursing notes.    I have reviewed the findings, diagnosis, plan and need for follow up with the patient.    Discharge Medication List as of 5/26/2017  6:43 PM          Final diagnoses:   Pain of toe of left foot       5/26/2017   [facility], [place], EMERGENCY DEPARTMENT     Avila Gonzalez MD  06/14/17 [id number]

## 2017-06-19 ENCOUNTER — TELEPHONE (OUTPATIENT)
Dept: FAMILY MEDICINE | Facility: CLINIC | Age: 47
End: 2017-06-19

## 2017-06-27 DIAGNOSIS — M47.26 OSTEOARTHRITIS OF SPINE WITH RADICULOPATHY, LUMBAR REGION: ICD-10-CM

## 2017-06-27 NOTE — TELEPHONE ENCOUNTER
Flexeril      Last Written Prescription Date:  06/09/2017  Last Fill Quantity: 20,   # refills: 0  Last Office Visit with [facility], [facility] or [facility] prescribing provider: 6/13/2017  Future Office visit:    Next 5 appointments (look out 90 days)     Jun 29, 2017  2:30 PM CDT   [facility] with EFREN O[facility]   [facility]    [address]  [address]  [facility] [address]   [phone]            Jun 29, 2017  2:30 PM CDT   Return Visit with Dagoberto Chua PA-C   [facility]    [address]  [address]  [facility] [id number] [phone]                   Routing refill request to provider for review/approval because:  Drug not on the [facility], [facility] or [facility] refill protocol or controlled substance    Anirudh Jama, Pharm.D.  [facility]  [phone]

## 2017-06-28 ENCOUNTER — TELEPHONE (OUTPATIENT)
Dept: FAMILY MEDICINE | Facility: CLINIC | Age: 47
End: 2017-06-28

## 2017-06-28 DIAGNOSIS — F41.8 SITUATIONAL ANXIETY: Primary | ICD-10-CM

## 2017-06-28 RX ORDER — CYCLOBENZAPRINE HCL 5 MG
5 TABLET ORAL
Qty: 20 TABLET | Refills: 0 | Status: SHIPPED | OUTPATIENT
Start: 2017-06-28 | End: 2017-07-24

## 2017-06-28 RX ORDER — LORAZEPAM 1 MG/1
1 TABLET ORAL PRN
Qty: 1 TABLET | Refills: 0 | Status: SHIPPED | OUTPATIENT
Start: 2017-06-28

## 2017-06-28 NOTE — TELEPHONE ENCOUNTER
Reason for Call:  call back    Detailed comments: pt's having an MRI done tomorrow and she's requesting something for anxiety.    Phone Number Patient can be reached at: Home number on file [phone] (home)    Best Time: anytime    Can we leave a detailed message on this number? YES    Call taken on 6/28/2017 at 2:55 PM by Anirudh Syed

## 2017-06-29 ENCOUNTER — OFFICE VISIT (OUTPATIENT)
Dept: FAMILY MEDICINE | Facility: CLINIC | Age: 47
End: 2017-06-29
Payer: COMMERCIAL

## 2017-06-29 ENCOUNTER — OFFICE VISIT (OUTPATIENT)
Dept: BEHAVIORAL HEALTH | Facility: CLINIC | Age: 47
End: 2017-06-29
Payer: COMMERCIAL

## 2017-06-29 VITALS
WEIGHT: 155 LBS | RESPIRATION RATE: 16 BRPM | BODY MASS INDEX: 29.29 KG/M2 | DIASTOLIC BLOOD PRESSURE: 80 MMHG | OXYGEN SATURATION: 96 % | SYSTOLIC BLOOD PRESSURE: 120 MMHG | HEART RATE: 88 BPM | TEMPERATURE: 99 F

## 2017-06-29 DIAGNOSIS — F33.1 MAJOR DEPRESSIVE DISORDER, RECURRENT EPISODE, MODERATE (H): ICD-10-CM

## 2017-06-29 DIAGNOSIS — F33.1 MAJOR DEPRESSIVE DISORDER, RECURRENT EPISODE, MODERATE (H): Primary | ICD-10-CM

## 2017-06-29 DIAGNOSIS — F41.9 ANXIETY: Primary | ICD-10-CM

## 2017-06-29 DIAGNOSIS — M54.50 CHRONIC LOW BACK PAIN WITHOUT SCIATICA, UNSPECIFIED BACK PAIN LATERALITY: ICD-10-CM

## 2017-06-29 DIAGNOSIS — G89.29 CHRONIC LOW BACK PAIN WITHOUT SCIATICA, UNSPECIFIED BACK PAIN LATERALITY: ICD-10-CM

## 2017-06-29 PROCEDURE — 99213 OFFICE O/P EST LOW 20 MIN: CPT | Performed by: PHYSICIAN ASSISTANT

## 2017-06-29 PROCEDURE — 90832 PSYTX W PT 30 MINUTES: CPT | Performed by: SOCIAL WORKER

## 2017-06-29 RX ORDER — QUETIAPINE FUMARATE 300 MG/1
TABLET, FILM COATED ORAL
COMMUNITY
Start: 2017-04-17 | End: 2018-05-02

## 2017-06-29 RX ORDER — ESCITALOPRAM OXALATE 10 MG/1
TABLET ORAL
COMMUNITY
Start: 2017-06-15 | End: 2018-05-02

## 2017-06-29 RX ORDER — QUETIAPINE FUMARATE 25 MG/1
TABLET, FILM COATED ORAL
COMMUNITY
Start: 2016-12-26 | End: 2018-05-02

## 2017-06-29 RX ORDER — ASENAPINE MALEATE 10 MG/1
TABLET SUBLINGUAL
COMMUNITY
Start: 2017-06-15 | End: 2018-05-02

## 2017-06-29 NOTE — PROGRESS NOTES
[facility]  June 29, 2017      Behavioral Health Clinician Progress Note    Patient Name: Phil Mckoy           Service Type: Individual      Service Location:  in clinic      Session Start Time: 2:52pm Session End Time: 3:08pm     Session Length: 16 - 37      Attendees: Patient and PCP    Visit Activities (Refresh list every visit): [facility]    See Flowsheets for today's PHQ-9 and JENNIE-7 results  Previous PHQ-9:   PHQ-9 SCORE 6/29/2016 11/29/2016 12/26/2016   Total Score - - -   Total Score - - -   Total Score 0 0 0     Previous JENNIE-7:   JENNIE-7 SCORE 8/11/2015 8/26/2015 11/27/2015   Total Score - - -   Total Score - 0 0   Total Score 0 - -       MANUEL LEVEL:  MANUEL Score (Last Two) 1/29/2013 7/21/2014   MANUEL Raw Score 0 45   Activation Score 0 73.1   MANUEL Level 1 4       DATA  Extended Session (60+ minutes): No  Interactive Complexity: No  Crisis: No    Treatment Objective(s) Addressed in This Session:  Target Behavior(s): disease management/lifestyle changes  - manage physical and mental health symptoms    Depressed Mood: Increase interest, engagement, and pleasure in doing things  Decrease frequency and intensity of feeling down, depressed, hopeless  Improve quantity and quality of night time sleep / decrease daytime naps  Feel less tired and more energy during the day   Improve diet, appetite, mindful eating, and / or meal planning  Identify negative self-talk and behaviors: challenge core beliefs, myths, and actions  Anxiety: will experience a reduction in anxiety, will develop more effective coping skills to manage anxiety symptoms, will develop healthy cognitive patterns and beliefs and will increase ability to function adaptively  Adjustment Difficulties: will develop coping/problem-solving skills to facilitate more adaptive adjustment    Current Stressors / Issues:  [facility] met with patient at PCP request to assess current behavioral health needs and provide information  and support as necessary.  Pt reported that she continues to experience a lot of pain. She stated that she was not able to do the MRI this morning because she did not have someone to bring her home. Pt reported that she is struggling in the relationship with her fiance. Pt stated that he has MS and is in a lot of pain after he comes home from work. Pt reported that she knows what she is getting into now because she plans on marrying him. Pt was guarded in saying too much about it, but stated that he can be very difficult to deal with sometimes. Pt stated that she does have his brother to go to for support. Pt and writer processed the importance of having her own support and making sure that she is setting boundaries.    Reviewed new and ongoing stressors  Reviewed mental health symptoms and appropriate coping mechanisms    Progress on Treatment Objective(s) / Homework:  Minimal progress - CONTEMPLATION (Considering change and yet undecided); Intervened by assessing the negative and positive thinking (ambivalence) about behavior change    Motivational Interviewing    MI Intervention: Expressed Empathy/Understanding, Supported Autonomy, Collaboration, Evocation, Permission to raise concern or advise and Open-ended questions     Change Talk Expressed by the Patient: Desire to change Ability to change Reasons to change    Provider Response to Change Talk: E - Evoked more info from patient about behavior change, A - Affirmed patient's thoughts, decisions, or attempts at behavior change and R - Reflected patient's change talk      Care Plan review completed: No    Medication Review:  Per PCP note    Medication Compliance:  Per PCP note    Changes in Health Issues:   Per PCP note    Chemical Use Review:   Substance Use: Chemical use reviewed, no active concerns identified      Tobacco Use: No current tobacco use.      Assessment: Current Emotional / Mental Status (status of significant symptoms):    Risk status (Self /  Other harm or suicidal ideation)  Patient denies current fears or concerns for personal safety.  Patient denies current or recent suicidal ideation or behaviors.  Patient denies current or recent homicidal ideation or behaviors.  Patient denies current or recent self injurious behavior or ideation.  Patient denies other safety concerns.  A safety and risk management plan has not been developed at this time, however patient was encouraged to call [place] [phone] should there be a change in any of these risk factors.    Appearance:   Appropriate   Eye Contact:   Fair   Psychomotor Behavior: Normal   Attitude:   Cooperative   Orientation:   All  Speech   Rate / Production: Normal    Volume:  Normal   Mood:    Normal  Affect:    Appropriate   Thought Content:  Clear   Thought Form:  Coherent   Insight:    Fair     Provisional Diagnoses:  1. Anxiety    2. Major depressive disorder, recurrent episode, moderate (H)        Collateral Reports Completed:  Not Applicable    Plan: (Homework, other):  Patient was given information about behavioral services and encouraged to schedule a follow up appointment with the clinic [facility] as needed.  She was also given information about mental health symptoms and treatment options .  CD Recommendations: Maintain Sobriety.    EFREN Solorzano, [facility]
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
## 2017-06-29 NOTE — Clinical Note
Please help Patient reschedule MRI and reschedule with Sports Medicine for her lumbar sciatica.   Thank you, Dagoberto

## 2017-06-29 NOTE — MR AVS SNAPSHOT
"              After Visit Summary   6/29/2017    Phil Mckoy    MRN: [id number]           Patient Information     Date Of Birth          1970        Visit Information        Provider Department      6/29/2017 2:30 PM Dana Garay LICSW [facility]        Today's Diagnoses     Anxiety    -  1    Major depressive disorder, recurrent episode, moderate (H)           Follow-ups after your visit        Your next 10 appointments already scheduled     Jul 24, 2017  2:00 PM CDT   New Visit with Jazzmine Foote MD   [facility] ([facility])    [address]  [address]  [place] [id number] [phone]              Who to contact     If you have questions or need follow up information about today's clinic visit or your schedule please contact [facility] directly at [phone].  Normal or non-critical lab and imaging results will be communicated to you by MyChart, letter or phone within 4 business days after the clinic has received the results. If you do not hear from us within 7 days, please contact the clinic through MyChart or phone. If you have a critical or abnormal lab result, we will notify you by phone as soon as possible.  Submit refill requests through [email or web address] or call your pharmacy and they will forward the refill request to us. Please allow 3 business days for your refill to be completed.          Additional Information About Your Visit        MyChart Information     [email or web address] lets you send messages to your doctor, view your test results, renew your prescriptions, schedule appointments and more. To sign up, go to [email or web address] . Click on \"Log in\" on the left side of the screen, which will take you to the Welcome page. Then click on \"Sign up Now\" on the right side of the page.     You will be asked to enter the access code listed below, as well as some personal " information. Please follow the directions to create your username and password.     Your access code is: [email or web address]  Expires: 2017  6:30 AM     Your access code will  in 90 days. If you need help or a new code, please call your [facility] clinic or [phone].        Care EveryWhere ID     This is your Care EveryWhere ID. This could be used by other organizations to access your [facility] medical records  [id number]         Blood Pressure from Last 3 Encounters:   17 120/80   17 122/86   17 120/78    Weight from Last 3 Encounters:   17 155 lb (70.3 kg)   17 157 lb (71.2 kg)   17 160 lb (72.6 kg)              Today, you had the following     No orders found for display       Primary Care Provider    Physician No Ref-Primary       No address on file        Equal Access to Services     [facility] : Hadii portillo bowmano Sodaryn, waaxda luqadaha, qaybta kaalmada adejasminyada, jose tomlin . So [facility] [phone].    ATENCIÓN: Si habla español, tiene a hayes disposición servicios gratuitos de asistencia lingüística. Brigette al [phone].    We comply with applicable federal civil rights laws and [place] laws. We do not discriminate on the basis of race, color, national origin, age, disability sex, sexual orientation or gender identity.            Thank you!     Thank you for choosing [facility]  for your care. Our goal is always to provide you with excellent care. Hearing back from our patients is one way we can continue to improve our services. Please take a few minutes to complete the written survey that you may receive in the mail after your visit with us. Thank you!             Your Updated Medication List - Protect others around you: Learn how to safely use, store and throw away your medicines at www.disposemymeds.org.          This list is accurate as of: 17  4:22 PM.  Always use your most recent med list.                    Brand Name Dispense Instructions for use Diagnosis    albuterol 108 (90 BASE) MCG/ACT Inhaler    PROAIR HFA/PROVENTIL HFA/VENTOLIN HFA    1 Inhaler    Inhale 2 puffs into the lungs every 6 hours as needed for shortness of breath / dyspnea    Intermittent asthma, uncomplicated       ALPRAZolam 0.5 MG tablet    XANAX    2 tablet    Take 1 tablet (0.5 mg) by mouth 3 times daily as needed for anxiety    Chronic pain of right knee       amitriptyline 25 MG tablet    ELAVIL    90 tablet    Take 3 tablets (75 mg) by mouth At Bedtime    Nonintractable migraine, unspecified migraine type       buPROPion 150 MG 12 hr tablet    WELLBUTRIN SR    180 tablet    Take 1 tablet (150 mg) by mouth 2 times daily    Anxiety, Adjustment disorder with mixed anxiety and depressed mood, Major depressive disorder, recurrent episode, moderate (H)       calcium carbonate 500 MG chewable tablet    TUMS    100 tablet    Take 1 tablet (500 mg) by mouth 2 times daily    Gastroesophageal reflux disease without esophagitis       cyclobenzaprine 5 MG tablet    FLEXERIL    20 tablet    Take 1 tablet (5 mg) by mouth nightly as needed for muscle spasms    Osteoarthritis of spine with radiculopathy, lumbar region       escitalopram 10 MG tablet    LEXAPRO          fluticasone 50 MCG/ACT spray    FLONASE    16 g    Spray 1-2 sprays into both nostrils daily    Allergy to pollen       ketorolac 10 MG tablet    TORADOL    20 tablet    Take 1 tablet (10 mg) by mouth every 6 hours as needed for pain    Lumbar radiculopathy, right       LORazepam 1 MG tablet    ATIVAN    1 tablet    Take 1 tablet (1 mg) by mouth as needed 30 minutes prior appointment    Situational anxiety       methylcellulose 500 MG Tabs tablet    CITRUCEL    30 each    Take 1 tablet (500 mg) by mouth daily    Constipation, unspecified constipation type, Gastroesophageal reflux disease without esophagitis       mometasone-formoterol 100-5 MCG/ACT oral inhaler    DULERA    1  Inhaler    Inhale 2 puffs into the lungs 2 times daily    Intermittent asthma, uncomplicated       multivitamin, therapeutic with minerals Tabs tablet     100 tablet    Take 1 tablet by mouth daily    Osteoarthritis of spine with radiculopathy, lumbar region       nitroFURantoin (macrocrystal-monohydrate) 100 MG capsule    MACROBID    14 capsule    Take 1 capsule (100 mg) by mouth 2 times daily    Urinary tract infection with hematuria, site unspecified       order for DME     1 Device    Equipment being ordered: Back brace    Chronic midline low back pain without sciatica       oxymetazoline 0.05 % spray    AFRIN NASAL SPRAY    1 Bottle    Spray 2-3 sprays into both nostrils 2 times daily as needed for congestion    Viral upper respiratory tract infection with cough       polyethylene glycol powder    MIRALAX    510 g    Take 17 g (1 capful) by mouth daily    Blood in stool       prazosin 2 MG capsule    MINIPRESS    30 capsule    Take 1 capsule (2 mg) by mouth At Bedtime    Major depressive disorder, recurrent episode, moderate (H), Nightmares, Hallucinations, visual       * QUEtiapine 25 MG tablet    SEROquel          * QUEtiapine 300 MG tablet    SEROquel          ranitidine 300 MG tablet    ZANTAC    30 tablet    Take 1 tablet (300 mg) by mouth At Bedtime    Gastroesophageal reflux disease without esophagitis       SAPHRIS 10 MG Subl sublingual tablet   Generic drug:  asenapine           SUMAtriptan 100 MG tablet    IMITREX    18 tablet    Take 1 tablet (100 mg) by mouth at onset of headache for migraine May repeat dose in 2 hours.  Do not exceed 200 mg in 24 hours    Migraine without status migrainosus, not intractable, unspecified migraine type       vitamin D 2000 UNITS tablet     100 tablet    Take 2,000 Units by mouth daily    Osteoarthritis of spine with radiculopathy, lumbar region       * Notice:  This list has 2 medication(s) that are the same as other medications prescribed for you. Read the  directions carefully, and ask your doctor or other care provider to review them with you.

## 2017-06-29 NOTE — MR AVS SNAPSHOT
"              After Visit Summary   6/29/2017    Phil Mckoy    MRN: [id number]           Patient Information     Date Of Birth          1970        Visit Information        Provider Department      6/29/2017 2:30 PM Dagoberto Chua PA-C [facility]        Today's Diagnoses     Major depressive disorder, recurrent episode, moderate (H)    -  1    Chronic low back pain without sciatica, unspecified back pain laterality           Follow-ups after your visit        Your next 10 appointments already scheduled     Jul 24, 2017  2:00 PM CDT   New Visit with Jzazmine Foote MD   [facility]    [address] [facility] [address] [address] [place] [address]  [address]  [place] [address] [phone]              Who to contact     If you have questions or need follow up information about today's clinic visit or your schedule please contact [facility] directly at [phone].  Normal or non-critical lab and imaging results will be communicated to you by MyChart, letter or phone within 4 business days after the clinic has received the results. If you do not hear from us within 7 days, please contact the clinic through quickhuddlehart or phone. If you have a critical or abnormal lab result, we will notify you by phone as soon as possible.  Submit refill requests through [employer] or call your pharmacy and they will forward the refill request to us. Please allow 3 business days for your refill to be completed.          Additional Information About Your Visit        MyChart Information     [employer] lets you send messages to your doctor, view your test results, renew your prescriptions, schedule appointments and more. To sign up, go to [email or web address] . Click on \"Log in\" on the left side of the screen, which will take you to the Welcome page. Then click on \"Sign up Now\" on the right side of the page.     You will be " asked to enter the access code listed below, as well as some personal information. Please follow the directions to create your username and password.     Your access code is: [email or web address]  Expires: 2017  6:30 AM     Your access code will  in 90 days. If you need help or a new code, please call your [place] clinic or [phone].        Care EveryWhere ID     This is your Care EveryWhere ID. This could be used by other organizations to access your [place] medical records  [id number]        Your Vitals Were     Pulse Temperature Respirations Pulse Oximetry BMI (Body Mass Index)       88 99  F (37.2  C) (Oral) 16 96% 29.29 kg/m2        Blood Pressure from Last 3 Encounters:   17 120/80   17 122/86   17 120/78    Weight from Last 3 Encounters:   17 155 lb (70.3 kg)   17 157 lb (71.2 kg)   17 160 lb (72.6 kg)              Today, you had the following     No orders found for display       Primary Care Provider    Physician No Ref-Primary       No address on file        Equal Access to Services     [facility]: Hadii portillo claros hadsilvanoo Sodaryn, waaxda lulawrence, qaybta kaalmada adekriss, jose tomlin . So [facility] [phone].    ATENCIÓN: Si habla español, tiene a hayes disposición servicios gratuitos de asistencia lingüística. Llame al [phone].    We comply with applicable federal civil rights laws and [place] laws. We do not discriminate on the basis of race, color, national origin, age, disability sex, sexual orientation or gender identity.            Thank you!     Thank you for choosing [facility]  for your care. Our goal is always to provide you with excellent care. Hearing back from our patients is one way we can continue to improve our services. Please take a few minutes to complete the written survey that you may receive in the mail after your visit with us. Thank you!             Your Updated Medication  List - Protect others around you: Learn how to safely use, store and throw away your medicines at www.disposemymeds.org.          This list is accurate as of: 6/29/17  4:14 PM.  Always use your most recent med list.                   Brand Name Dispense Instructions for use Diagnosis    albuterol 108 (90 BASE) MCG/ACT Inhaler    PROAIR HFA/PROVENTIL HFA/VENTOLIN HFA    1 Inhaler    Inhale 2 puffs into the lungs every 6 hours as needed for shortness of breath / dyspnea    Intermittent asthma, uncomplicated       ALPRAZolam 0.5 MG tablet    XANAX    2 tablet    Take 1 tablet (0.5 mg) by mouth 3 times daily as needed for anxiety    Chronic pain of right knee       amitriptyline 25 MG tablet    ELAVIL    90 tablet    Take 3 tablets (75 mg) by mouth At Bedtime    Nonintractable migraine, unspecified migraine type       buPROPion 150 MG 12 hr tablet    WELLBUTRIN SR    180 tablet    Take 1 tablet (150 mg) by mouth 2 times daily    Anxiety, Adjustment disorder with mixed anxiety and depressed mood, Major depressive disorder, recurrent episode, moderate (H)       calcium carbonate 500 MG chewable tablet    TUMS    100 tablet    Take 1 tablet (500 mg) by mouth 2 times daily    Gastroesophageal reflux disease without esophagitis       cyclobenzaprine 5 MG tablet    FLEXERIL    20 tablet    Take 1 tablet (5 mg) by mouth nightly as needed for muscle spasms    Osteoarthritis of spine with radiculopathy, lumbar region       escitalopram 10 MG tablet    LEXAPRO          fluticasone 50 MCG/ACT spray    FLONASE    16 g    Spray 1-2 sprays into both nostrils daily    Allergy to pollen       ketorolac 10 MG tablet    TORADOL    20 tablet    Take 1 tablet (10 mg) by mouth every 6 hours as needed for pain    Lumbar radiculopathy, right       LORazepam 1 MG tablet    ATIVAN    1 tablet    Take 1 tablet (1 mg) by mouth as needed 30 minutes prior appointment    Situational anxiety       methylcellulose 500 MG Tabs tablet    CITRUCEL    30  each    Take 1 tablet (500 mg) by mouth daily    Constipation, unspecified constipation type, Gastroesophageal reflux disease without esophagitis       mometasone-formoterol 100-5 MCG/ACT oral inhaler    DULERA    1 Inhaler    Inhale 2 puffs into the lungs 2 times daily    Intermittent asthma, uncomplicated       multivitamin, therapeutic with minerals Tabs tablet     100 tablet    Take 1 tablet by mouth daily    Osteoarthritis of spine with radiculopathy, lumbar region       nitroFURantoin (macrocrystal-monohydrate) 100 MG capsule    MACROBID    14 capsule    Take 1 capsule (100 mg) by mouth 2 times daily    Urinary tract infection with hematuria, site unspecified       order for DME     1 Device    Equipment being ordered: Back brace    Chronic midline low back pain without sciatica       oxymetazoline 0.05 % spray    AFRIN NASAL SPRAY    1 Bottle    Spray 2-3 sprays into both nostrils 2 times daily as needed for congestion    Viral upper respiratory tract infection with cough       polyethylene glycol powder    MIRALAX    510 g    Take 17 g (1 capful) by mouth daily    Blood in stool       prazosin 2 MG capsule    MINIPRESS    30 capsule    Take 1 capsule (2 mg) by mouth At Bedtime    Major depressive disorder, recurrent episode, moderate (H), Nightmares, Hallucinations, visual       * QUEtiapine 25 MG tablet    SEROquel          * QUEtiapine 300 MG tablet    SEROquel          ranitidine 300 MG tablet    ZANTAC    30 tablet    Take 1 tablet (300 mg) by mouth At Bedtime    Gastroesophageal reflux disease without esophagitis       SAPHRIS 10 MG Subl sublingual tablet   Generic drug:  asenapine           SUMAtriptan 100 MG tablet    IMITREX    18 tablet    Take 1 tablet (100 mg) by mouth at onset of headache for migraine May repeat dose in 2 hours.  Do not exceed 200 mg in 24 hours    Migraine without status migrainosus, not intractable, unspecified migraine type       vitamin D 2000 UNITS tablet     100 tablet     Take 2,000 Units by mouth daily    Osteoarthritis of spine with radiculopathy, lumbar region       * Notice:  This list has 2 medication(s) that are the same as other medications prescribed for you. Read the directions carefully, and ask your doctor or other care provider to review them with you.

## 2017-06-30 ENCOUNTER — APPOINTMENT (OUTPATIENT)
Dept: GENERAL RADIOLOGY | Facility: CLINIC | Age: 47
End: 2017-06-30
Attending: INTERNAL MEDICINE

## 2017-06-30 ENCOUNTER — APPOINTMENT (OUTPATIENT)
Dept: CT IMAGING | Facility: CLINIC | Age: 47
End: 2017-06-30
Attending: INTERNAL MEDICINE

## 2017-06-30 ENCOUNTER — HOSPITAL ENCOUNTER (EMERGENCY)
Facility: CLINIC | Age: 47
Discharge: HOME | End: 2017-06-30
Attending: INTERNAL MEDICINE | Admitting: INTERNAL MEDICINE

## 2017-06-30 VITALS
BODY MASS INDEX: 27.97 KG/M2 | TEMPERATURE: 98.8 F | DIASTOLIC BLOOD PRESSURE: 82 MMHG | RESPIRATION RATE: 16 BRPM | WEIGHT: 152 LBS | SYSTOLIC BLOOD PRESSURE: 124 MMHG | OXYGEN SATURATION: 100 % | HEIGHT: 62 IN

## 2017-06-30 DIAGNOSIS — M54.42 ACUTE LOW BACK PAIN WITH LEFT-SIDED SCIATICA, UNSPECIFIED BACK PAIN LATERALITY: ICD-10-CM
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
## 2017-06-30 DIAGNOSIS — M54.2 CERVICALGIA: ICD-10-CM

## 2017-06-30 DIAGNOSIS — M54.2 NECK PAIN, ACUTE: ICD-10-CM

## 2017-06-30 DIAGNOSIS — S20.219A CHEST WALL CONTUSION, UNSPECIFIED LATERALITY, INITIAL ENCOUNTER: ICD-10-CM

## 2017-06-30 DIAGNOSIS — S80.12XA CONTUSION OF LEFT LEG, INITIAL ENCOUNTER: ICD-10-CM
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
## 2017-06-30 DIAGNOSIS — M54.42 MIDLINE LOW BACK PAIN WITH LEFT-SIDED SCIATICA, UNSPECIFIED CHRONICITY: ICD-10-CM

## 2017-06-30 LAB
BASOPHILS # BLD AUTO: 0 10E9/L (ref 0–0.2)
BASOPHILS NFR BLD AUTO: 0.1 %
CREAT BLD-MCNC: 0.7 MG/DL (ref 0.52–1.04)
DIFFERENTIAL METHOD BLD: NORMAL
EOSINOPHIL # BLD AUTO: 0.1 10E9/L (ref 0–0.7)
EOSINOPHIL NFR BLD AUTO: 0.7 %
ERYTHROCYTE [DISTWIDTH] IN BLOOD BY AUTOMATED COUNT: 13 % (ref 10–15)
GFR SERPL CREATININE-BSD FRML MDRD: >90 ML/MIN/1.7M2
HCT VFR BLD AUTO: 37.9 % (ref 35–47)
HGB BLD-MCNC: 13.1 G/DL (ref 11.7–15.7)
IMM GRANULOCYTES # BLD: 0 10E9/L (ref 0–0.4)
IMM GRANULOCYTES NFR BLD: 0.2 %
LYMPHOCYTES # BLD AUTO: 3.2 10E9/L (ref 0.8–5.3)
LYMPHOCYTES NFR BLD AUTO: 39.1 %
MCH RBC QN AUTO: 29.4 PG (ref 26.5–33)
MCHC RBC AUTO-ENTMCNC: 34.6 G/DL (ref 31.5–36.5)
MCV RBC AUTO: 85 FL (ref 78–100)
MONOCYTES # BLD AUTO: 0.7 10E9/L (ref 0–1.3)
MONOCYTES NFR BLD AUTO: 7.9 %
NEUTROPHILS # BLD AUTO: 4.3 10E9/L (ref 1.6–8.3)
NEUTROPHILS NFR BLD AUTO: 52 %
NRBC # BLD AUTO: 0 10*3/UL
NRBC BLD AUTO-RTO: 0 /100
NT-PROBNP SERPL-MCNC: 61 PG/ML (ref 0–450)
PLATELET # BLD AUTO: 216 10E9/L (ref 150–450)
RADIOLOGIST FLAGS: NORMAL
RBC # BLD AUTO: 4.45 10E12/L (ref 3.8–5.2)
WBC # BLD AUTO: 8.3 10E9/L (ref 4–11)

## 2017-06-30 PROCEDURE — 96374 THER/PROPH/DIAG INJ IV PUSH: CPT | Mod: 59 | Performed by: INTERNAL MEDICINE

## 2017-06-30 PROCEDURE — 96376 TX/PRO/DX INJ SAME DRUG ADON: CPT | Performed by: INTERNAL MEDICINE

## 2017-06-30 PROCEDURE — 25000128 H RX IP 250 OP 636: Performed by: INTERNAL MEDICINE

## 2017-06-30 PROCEDURE — 73590 X-RAY EXAM OF LOWER LEG: CPT | Mod: LT

## 2017-06-30 PROCEDURE — 83880 ASSAY OF NATRIURETIC PEPTIDE: CPT | Performed by: INTERNAL MEDICINE

## 2017-06-30 PROCEDURE — 72131 CT LUMBAR SPINE W/O DYE: CPT

## 2017-06-30 PROCEDURE — 71260 CT THORAX DX C+: CPT

## 2017-06-30 PROCEDURE — 99285 EMERGENCY DEPT VISIT HI MDM: CPT | Mod: Z6 | Performed by: INTERNAL MEDICINE

## 2017-06-30 PROCEDURE — 96375 TX/PRO/DX INJ NEW DRUG ADDON: CPT | Performed by: INTERNAL MEDICINE

## 2017-06-30 PROCEDURE — 72125 CT NECK SPINE W/O DYE: CPT

## 2017-06-30 PROCEDURE — 70450 CT HEAD/BRAIN W/O DYE: CPT

## 2017-06-30 PROCEDURE — 96361 HYDRATE IV INFUSION ADD-ON: CPT | Performed by: INTERNAL MEDICINE

## 2017-06-30 PROCEDURE — 74177 CT ABD & PELVIS W/CONTRAST: CPT

## 2017-06-30 PROCEDURE — 99285 EMERGENCY DEPT VISIT HI MDM: CPT | Mod: 25 | Performed by: INTERNAL MEDICINE

## 2017-06-30 PROCEDURE — 85025 COMPLETE CBC W/AUTO DIFF WBC: CPT | Performed by: INTERNAL MEDICINE

## 2017-06-30 PROCEDURE — 82565 ASSAY OF CREATININE: CPT

## 2017-06-30 RX ORDER — KETOROLAC TROMETHAMINE 30 MG/ML
30 INJECTION, SOLUTION INTRAMUSCULAR; INTRAVENOUS ONCE
Status: COMPLETED | OUTPATIENT
Start: 2017-06-30 | End: 2017-06-30

## 2017-06-30 RX ORDER — OXYCODONE AND ACETAMINOPHEN 5; 325 MG/1; MG/1
1-2 TABLET ORAL EVERY 6 HOURS PRN
Qty: 20 TABLET | Refills: 0 | Status: SHIPPED | OUTPATIENT
Start: 2017-06-30 | End: 2017-07-24

## 2017-06-30 RX ORDER — HYDROMORPHONE HYDROCHLORIDE 1 MG/ML
0.5 INJECTION, SOLUTION INTRAMUSCULAR; INTRAVENOUS; SUBCUTANEOUS ONCE
Status: COMPLETED | OUTPATIENT
Start: 2017-06-30 | End: 2017-06-30

## 2017-06-30 RX ORDER — IBUPROFEN 600 MG/1
600 TABLET, FILM COATED ORAL EVERY 6 HOURS PRN
Qty: 30 TABLET | Refills: 1 | Status: SHIPPED | OUTPATIENT
Start: 2017-06-30 | End: 2017-07-24

## 2017-06-30 RX ORDER — IOPAMIDOL 755 MG/ML
93 INJECTION, SOLUTION INTRAVASCULAR ONCE
Status: COMPLETED | OUTPATIENT
Start: 2017-06-30 | End: 2017-06-30

## 2017-06-30 RX ORDER — SODIUM CHLORIDE 9 MG/ML
1000 INJECTION, SOLUTION INTRAVENOUS CONTINUOUS
Status: DISCONTINUED | OUTPATIENT
Start: 2017-06-30 | End: 2017-06-30 | Stop reason: HOSPADM

## 2017-06-30 RX ADMIN — SODIUM CHLORIDE 1000 ML: 9 INJECTION, SOLUTION INTRAVENOUS at 19:01

## 2017-06-30 RX ADMIN — HYDROMORPHONE HYDROCHLORIDE 0.5 MG: 1 INJECTION, SOLUTION INTRAMUSCULAR; INTRAVENOUS; SUBCUTANEOUS at 21:07

## 2017-06-30 RX ADMIN — IOPAMIDOL 93 ML: 755 INJECTION, SOLUTION INTRAVENOUS at 19:51

## 2017-06-30 RX ADMIN — HYDROMORPHONE HYDROCHLORIDE 0.5 MG: 1 INJECTION, SOLUTION INTRAMUSCULAR; INTRAVENOUS; SUBCUTANEOUS at 18:58

## 2017-06-30 RX ADMIN — KETOROLAC TROMETHAMINE 30 MG: 30 INJECTION, SOLUTION INTRAMUSCULAR at 21:07

## 2017-06-30 ASSESSMENT — ENCOUNTER SYMPTOMS
FEVER: 0
NAUSEA: 0
RHINORRHEA: 0
CONFUSION: 0
COUGH: 0
BACK PAIN: 0
WEAKNESS: 1
LIGHT-HEADEDNESS: 0
CHILLS: 0
ABDOMINAL PAIN: 0
DIFFICULTY URINATING: 0
PALPITATIONS: 0
SHORTNESS OF BREATH: 0
WOUND: 0
NECK PAIN: 0
HEADACHES: 1
VOMITING: 0
NUMBNESS: 1
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
## 2017-06-30 NOTE — ED AVS SNAPSHOT
[facility], [place], Emergency Department    [address] [place] [address]    Phone:  [phone]                                       Phil Mckoy   MRN: [id number]    Department:  [facility], Emergency Department   Date of Visit:  6/30/2017           After Visit Summary Signature Page     I have received my discharge instructions, and my questions have been answered. I have discussed any challenges I see with this plan with the nurse or doctor.    ..........................................................................................................................................  Patient/Patient Representative Signature      ..........................................................................................................................................  Patient Representative Print Name and Relationship to Patient    ..................................................               ................................................  Date                                            Time    ..........................................................................................................................................  Reviewed by Signature/Title    ...................................................              ..............................................  Date                                                            Time
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
## 2017-07-01 NOTE — DISCHARGE INSTRUCTIONS
Ice to painful areas 20 minutes 4 times/ day.  Percocet 1-2 every 6 hours as needed for pain.  Ibuprofen 600 mg every 6 hours as needed for pain.  Follow up with Dr Cha at the [facility]. [phone].

## 2017-07-05 ENCOUNTER — CARE COORDINATION (OUTPATIENT)
Dept: CARE COORDINATION | Facility: CLINIC | Age: 47
End: 2017-07-05
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
## 2017-07-07 ENCOUNTER — TELEPHONE (OUTPATIENT)
Dept: FAMILY MEDICINE | Facility: CLINIC | Age: 47
End: 2017-07-07
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
## 2017-07-10 NOTE — TELEPHONE ENCOUNTER
Can we please just have her follow-up with Patti at her appointment and they can discuss this.   Thanks.   JESSIKA Mckeon CNP

## 2017-07-24 ENCOUNTER — OFFICE VISIT (OUTPATIENT)
Dept: ADDICTION MEDICINE | Facility: CLINIC | Age: 47
End: 2017-07-24
Payer: COMMERCIAL

## 2017-07-24 VITALS
TEMPERATURE: 98.6 F | DIASTOLIC BLOOD PRESSURE: 68 MMHG | SYSTOLIC BLOOD PRESSURE: 124 MMHG | WEIGHT: 150 LBS | BODY MASS INDEX: 27.6 KG/M2 | OXYGEN SATURATION: 95 % | HEIGHT: 62 IN | RESPIRATION RATE: 14 BRPM | HEART RATE: 85 BPM

## 2017-07-24 DIAGNOSIS — M54.2 CERVICALGIA: ICD-10-CM

## 2017-07-24 DIAGNOSIS — M54.50 CHRONIC LOW BACK PAIN WITHOUT SCIATICA, UNSPECIFIED BACK PAIN LATERALITY: ICD-10-CM

## 2017-07-24 DIAGNOSIS — M79.18 MYOFASCIAL PAIN: Primary | ICD-10-CM

## 2017-07-24 DIAGNOSIS — G89.29 CHRONIC LOW BACK PAIN WITHOUT SCIATICA, UNSPECIFIED BACK PAIN LATERALITY: ICD-10-CM

## 2017-07-24 PROCEDURE — 99205 OFFICE O/P NEW HI 60 MIN: CPT | Performed by: ANESTHESIOLOGY

## 2017-07-24 RX ORDER — METHOCARBAMOL 750 MG/1
750 TABLET, FILM COATED ORAL 4 TIMES DAILY PRN
Qty: 180 TABLET | Refills: 0 | Status: SHIPPED | OUTPATIENT
Start: 2017-07-24 | End: 2017-07-31 | Stop reason: ALTCHOICE
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
## 2017-07-24 NOTE — PATIENT INSTRUCTIONS
1. Physical therapy referal for [place] for [facility] medicine  2. Trial of robaxin - discontinue using flexeril  3. Follow up with me after having 4-8 sessions of PT    Jazzmine Foote MD

## 2017-07-31 ENCOUNTER — OFFICE VISIT (OUTPATIENT)
Dept: FAMILY MEDICINE | Facility: CLINIC | Age: 47
End: 2017-07-31

## 2017-07-31 ENCOUNTER — OFFICE VISIT (OUTPATIENT)
Dept: BEHAVIORAL HEALTH | Facility: CLINIC | Age: 47
End: 2017-07-31

## 2017-07-31 ENCOUNTER — TELEPHONE (OUTPATIENT)
Dept: FAMILY MEDICINE | Facility: CLINIC | Age: 47
End: 2017-07-31

## 2017-07-31 ENCOUNTER — HOSPITAL ENCOUNTER (OUTPATIENT)
Dept: GENERAL RADIOLOGY | Facility: CLINIC | Age: 47
Discharge: HOME | End: 2017-07-31
Attending: NURSE PRACTITIONER | Admitting: NURSE PRACTITIONER
Payer: COMMERCIAL

## 2017-07-31 VITALS
BODY MASS INDEX: 27.34 KG/M2 | DIASTOLIC BLOOD PRESSURE: 60 MMHG | TEMPERATURE: 98.7 F | OXYGEN SATURATION: 96 % | HEART RATE: 84 BPM | WEIGHT: 149.5 LBS | RESPIRATION RATE: 16 BRPM | SYSTOLIC BLOOD PRESSURE: 102 MMHG

## 2017-07-31 DIAGNOSIS — M62.838 MUSCLE SPASM: ICD-10-CM

## 2017-07-31 DIAGNOSIS — M25.511 ACUTE PAIN OF RIGHT SHOULDER: ICD-10-CM

## 2017-07-31 DIAGNOSIS — Z53.9 ERRONEOUS ENCOUNTER--DISREGARD: Primary | ICD-10-CM

## 2017-07-31 DIAGNOSIS — S43.102D AC SEPARATION, TYPE 3, LEFT, SUBSEQUENT ENCOUNTER: Primary | ICD-10-CM

## 2017-07-31 DIAGNOSIS — M54.2 CERVICALGIA: Primary | ICD-10-CM

## 2017-07-31 DIAGNOSIS — M54.2 CERVICALGIA: ICD-10-CM

## 2017-07-31 PROCEDURE — 99214 OFFICE O/P EST MOD 30 MIN: CPT | Performed by: NURSE PRACTITIONER

## 2017-07-31 PROCEDURE — 73030 X-RAY EXAM OF SHOULDER: CPT | Mod: RT

## 2017-07-31 RX ORDER — BACLOFEN 10 MG/1
10 TABLET ORAL 3 TIMES DAILY
Qty: 270 TABLET | Refills: 1 | Status: SHIPPED | OUTPATIENT
Start: 2017-07-31 | End: 2017-08-07

## 2017-07-31 RX ORDER — TRAMADOL HYDROCHLORIDE 50 MG/1
50 TABLET ORAL EVERY 6 HOURS PRN
Qty: 90 TABLET | Refills: 1 | Status: SHIPPED | OUTPATIENT
Start: 2017-07-31 | End: 2017-08-07

## 2017-07-31 NOTE — MR AVS SNAPSHOT
"              After Visit Summary   7/31/2017    Phil Mckoy    MRN: [id number]           Patient Information     Date Of Birth          1970        Visit Information        Provider Department      7/31/2017 11:30 AM Mitchel Lepe[place] [facility]        Today's Diagnoses     ERRONEOUS ENCOUNTER--DISREGARD    -  1       Follow-ups after your visit        Future tests that were ordered for you today     Open Future Orders        Priority Expected Expires Ordered    XR Shoulder Right 2 Views Routine 7/31/2017 7/31/2018 7/31/2017            Who to contact     If you have questions or need follow up information about today's clinic visit or your schedule please contact [facility] PRIMARY [facility] directly at [phone].  Normal or non-critical lab and imaging results will be communicated to you by GoGo Techhart, letter or phone within 4 business days after the clinic has received the results. If you do not hear from us within 7 days, please contact the clinic through GoGo Techhart or phone. If you have a critical or abnormal lab result, we will notify you by phone as soon as possible.  Submit refill requests through Dine in or call your pharmacy and they will forward the refill request to us. Please allow 3 business days for your refill to be completed.          Additional Information About Your Visit        GoGo Techhart Information     Dine in lets you send messages to your doctor, view your test results, renew your prescriptions, schedule appointments and more. To sign up, go to [email or web address] . Click on \"Log in\" on the left side of the screen, which will take you to the Welcome page. Then click on \"Sign up Now\" on the right side of the page.     You will be asked to enter the access code listed below, as well as some personal information. Please follow the directions to create your username and password.     Your access code is: [email or web address]  Expires: " 10/30/2017  9:33 AM     Your access code will  in 90 days. If you need help or a new code, please call your [place] clinic or [phone].        Care EveryWhere ID     This is your Care EveryWhere ID. This could be used by other organizations to access your [facility] medical records  [id number]         Blood Pressure from Last 3 Encounters:   17 102/60   17 124/68   17 124/82    Weight from Last 3 Encounters:   17 149 lb 8 oz (67.8 kg)   17 150 lb (68 kg)   17 152 lb (68.9 kg)              Today, you had the following     No orders found for display         Today's Medication Changes          These changes are accurate as of: 17 11:59 PM.  If you have any questions, ask your nurse or doctor.               Start taking these medicines.        Dose/Directions    baclofen 10 MG tablet   Commonly known as:  LIORESAL   Used for:  Muscle spasm, Acute pain of right shoulder   Started by:  Kaylee Rodriguez APRN CNP        Dose:  10 mg   Take 1 tablet (10 mg) by mouth 3 times daily   Quantity:  270 tablet   Refills:  1       traMADol 50 MG tablet   Commonly known as:  ULTRAM   Used for:  Cervicalgia, Acute pain of right shoulder   Started by:  Kaylee Rodriguez APRN CNP        Dose:  50 mg   Take 1 tablet (50 mg) by mouth every 6 hours as needed for pain maximum  4 tablet(s) per day   Quantity:  90 tablet   Refills:  1         Stop taking these medicines if you haven't already. Please contact your care team if you have questions.     methocarbamol 750 MG tablet   Commonly known as:  ROBAXIN   Stopped by:  Kaylee Rodriguez APRN CNP                Where to get your medicines      These medications were sent to [facility] [place], [place] - [address]  [address] [address]     Phone:  [phone]     baclofen 10 MG tablet         Some of these will need a paper prescription and others can be bought over the counter.   Ask your nurse if you have questions.     Bring a paper prescription for each of these medications     traMADol 50 MG tablet                Primary Care Provider    None Specified       No primary provider on file.        Equal Access to Services     [place] : Jeannie Bentley, raymundo gilman, jasonjodee cernamanohar padilla, jose mattson trevonjasmin alonso laSreerip salazar. So [facility] [phone].    ATENCIÓN: Si habla español, tiene a hayes disposición servicios gratuitos de asistencia lingüística. Llame al [phone].    We comply with applicable federal civil rights laws and [place] laws. We do not discriminate on the basis of race, color, national origin, age, disability sex, sexual orientation or gender identity.            Thank you!     Thank you for choosing [facility] PRIMARY CARE  for your care. Our goal is always to provide you with excellent care. Hearing back from our patients is one way we can continue to improve our services. Please take a few minutes to complete the written survey that you may receive in the mail after your visit with us. Thank you!             Your Updated Medication List - Protect others around you: Learn how to safely use, store and throw away your medicines at www.disposemymeds.org.          This list is accurate as of: 7/31/17 11:59 PM.  Always use your most recent med list.                   Brand Name Dispense Instructions for use Diagnosis    albuterol 108 (90 BASE) MCG/ACT Inhaler    PROAIR HFA/PROVENTIL HFA/VENTOLIN HFA    1 Inhaler    Inhale 2 puffs into the lungs every 6 hours as needed for shortness of breath / dyspnea    Intermittent asthma, uncomplicated       baclofen 10 MG tablet    LIORESAL    270 tablet    Take 1 tablet (10 mg) by mouth 3 times daily    Muscle spasm, Acute pain of right shoulder       buPROPion 150 MG 12 hr tablet    WELLBUTRIN SR    180 tablet    Take 1 tablet (150 mg) by mouth 2 times daily    Anxiety, Adjustment disorder  with mixed anxiety and depressed mood, Major depressive disorder, recurrent episode, moderate (H)       calcium carbonate 500 MG chewable tablet    TUMS    100 tablet    Take 1 tablet (500 mg) by mouth 2 times daily    Gastroesophageal reflux disease without esophagitis       escitalopram 10 MG tablet    LEXAPRO          fluticasone 50 MCG/ACT spray    FLONASE    16 g    Spray 1-2 sprays into both nostrils daily    Allergy to pollen       LORazepam 1 MG tablet    ATIVAN    1 tablet    Take 1 tablet (1 mg) by mouth as needed 30 minutes prior appointment    Situational anxiety       methylcellulose 500 MG Tabs tablet    CITRUCEL    30 each    Take 1 tablet (500 mg) by mouth daily    Constipation, unspecified constipation type, Gastroesophageal reflux disease without esophagitis       mometasone-formoterol 100-5 MCG/ACT oral inhaler    DULERA    1 Inhaler    Inhale 2 puffs into the lungs 2 times daily    Intermittent asthma, uncomplicated       multivitamin, therapeutic with minerals Tabs tablet     100 tablet    Take 1 tablet by mouth daily    Osteoarthritis of spine with radiculopathy, lumbar region       nitroFURantoin (macrocrystal-monohydrate) 100 MG capsule    MACROBID    14 capsule    Take 1 capsule (100 mg) by mouth 2 times daily    Urinary tract infection with hematuria, site unspecified       order for DME     1 Device    Equipment being ordered: Back brace    Chronic midline low back pain without sciatica       oxymetazoline 0.05 % spray    AFRIN NASAL SPRAY    1 Bottle    Spray 2-3 sprays into both nostrils 2 times daily as needed for congestion    Viral upper respiratory tract infection with cough       polyethylene glycol powder    MIRALAX    510 g    Take 17 g (1 capful) by mouth daily    Blood in stool       prazosin 2 MG capsule    MINIPRESS    30 capsule    Take 1 capsule (2 mg) by mouth At Bedtime    Major depressive disorder, recurrent episode, moderate (H), Nightmares, Hallucinations, visual        * QUEtiapine 25 MG tablet    SEROquel          * QUEtiapine 300 MG tablet    SEROquel          ranitidine 300 MG tablet    ZANTAC    30 tablet    Take 1 tablet (300 mg) by mouth At Bedtime    Gastroesophageal reflux disease without esophagitis       SAPHRIS 10 MG Subl sublingual tablet   Generic drug:  asenapine           SUMAtriptan 100 MG tablet    IMITREX    18 tablet    Take 1 tablet (100 mg) by mouth at onset of headache for migraine May repeat dose in 2 hours.  Do not exceed 200 mg in 24 hours    Migraine without status migrainosus, not intractable, unspecified migraine type       traMADol 50 MG tablet    ULTRAM    90 tablet    Take 1 tablet (50 mg) by mouth every 6 hours as needed for pain maximum  4 tablet(s) per day    Cervicalgia, Acute pain of right shoulder       vitamin D 2000 UNITS tablet     100 tablet    Take 2,000 Units by mouth daily    Osteoarthritis of spine with radiculopathy, lumbar region       * Notice:  This list has 2 medication(s) that are the same as other medications prescribed for you. Read the directions carefully, and ask your doctor or other care provider to review them with you.
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
## 2017-07-31 NOTE — MR AVS SNAPSHOT
"              After Visit Summary   7/31/2017    Phil Mckoy    MRN: [id number]           Patient Information     Date Of Birth          1970        Visit Information        Provider Department      7/31/2017 11:30 AM Kaylee Rodriguez APRN CNP [facility]        Today's Diagnoses     Cervicalgia    -  1    Acute pain of right shoulder        Muscle spasm          Care Instructions    Please have the shoulder xray done.  We will call you with the results and what we should do next to evaluate or manage your pain.       Come back at your next convenience to have an establish care visit with Me (Kaylee Rodriguez) or one of the other providers.       Try the Baclofen for the muscle spasms    You should get a call from Physical therapy regarding shoulder and back pain treatment.               Follow-ups after your visit        Who to contact     If you have questions or need follow up information about today's clinic visit or your schedule please contact [facility] directly at [phone].  Normal or non-critical lab and imaging results will be communicated to you by MyChart, letter or phone within 4 business days after the clinic has received the results. If you do not hear from us within 7 days, please contact the clinic through PageBiteshart or phone. If you have a critical or abnormal lab result, we will notify you by phone as soon as possible.  Submit refill requests through Bruxie or call your pharmacy and they will forward the refill request to us. Please allow 3 business days for your refill to be completed.          Additional Information About Your Visit        MyChart Information     Bruxie lets you send messages to your doctor, view your test results, renew your prescriptions, schedule appointments and more. To sign up, go to [email or web address] . Click on \"Log in\" on the left side of the screen, which will take you to the Welcome " "page. Then click on \"Sign up Now\" on the right side of the page.     You will be asked to enter the access code listed below, as well as some personal information. Please follow the directions to create your username and password.     Your access code is: [email or web address]  Expires: 2017  6:30 AM     Your access code will  in 90 days. If you need help or a new code, please call your [facility] clinic or [phone].        Care EveryWhere ID     This is your Care EveryWhere ID. This could be used by other organizations to access your [facility] medical records  [id number]        Your Vitals Were     Pulse Temperature Respirations Pulse Oximetry BMI (Body Mass Index)       84 98.7  F (37.1  C) (Oral) 16 96% 27.34 kg/m2        Blood Pressure from Last 3 Encounters:   17 102/60   17 124/68   17 124/82    Weight from Last 3 Encounters:   17 149 lb 8 oz (67.8 kg)   17 150 lb (68 kg)   17 152 lb (68.9 kg)              Today, you had the following     No orders found for display         Today's Medication Changes          These changes are accurate as of: 17 12:01 PM.  If you have any questions, ask your nurse or doctor.               Start taking these medicines.        Dose/Directions    baclofen 10 MG tablet   Commonly known as:  LIORESAL   Used for:  Muscle spasm, Acute pain of right shoulder   Started by:  Kaylee Rodriguez APRN CNP        Dose:  10 mg   Take 1 tablet (10 mg) by mouth 3 times daily   Quantity:  270 tablet   Refills:  1       traMADol 50 MG tablet   Commonly known as:  ULTRAM   Used for:  Cervicalgia, Acute pain of right shoulder   Started by:  Kaylee Rodriguez APRN CNP        Dose:  50 mg   Take 1 tablet (50 mg) by mouth every 6 hours as needed for pain maximum  4 tablet(s) per day   Quantity:  90 tablet   Refills:  1         Stop taking these medicines if you haven't already. Please contact your care team if you have questions.     methocarbamol " 750 MG tablet   Commonly known as:  ROBAXIN   Stopped by:  Kaylee Rodriguez APRN CNP                Where to get your medicines      These medications were sent to [facility] [place], [place] - [address]  [address], [facility] [address]     Phone:  [phone]     baclofen 10 MG tablet         Some of these will need a paper prescription and others can be bought over the counter.  Ask your nurse if you have questions.     Bring a paper prescription for each of these medications     traMADol 50 MG tablet                Primary Care Provider    None Specified       No primary provider on file.        Equal Access to Services     [facility]: Hadii portillo claros hadasho Sodaryn, waaxda luqadaha, qaybta kaalmada randy, jose tomlin . So [facility] [phone].    ATENCIÓN: Si habla español, tiene a hayes disposición servicios gratuitos de asistencia lingüística. [facility] [phone].    We comply with applicable federal civil rights laws and [place] laws. We do not discriminate on the basis of race, color, national origin, age, disability sex, sexual orientation or gender identity.            Thank you!     Thank you for choosing [facility] PRIMARY CARE  for your care. Our goal is always to provide you with excellent care. Hearing back from our patients is one way we can continue to improve our services. Please take a few minutes to complete the written survey that you may receive in the mail after your visit with us. Thank you!             Your Updated Medication List - Protect others around you: Learn how to safely use, store and throw away your medicines at www.disposemymeds.org.          This list is accurate as of: 7/31/17 12:01 PM.  Always use your most recent med list.                   Brand Name Dispense Instructions for use Diagnosis    albuterol 108 (90 BASE) MCG/ACT Inhaler    PROAIR HFA/PROVENTIL HFA/VENTOLIN HFA    1 Inhaler     Inhale 2 puffs into the lungs every 6 hours as needed for shortness of breath / dyspnea    Intermittent asthma, uncomplicated       baclofen 10 MG tablet    LIORESAL    270 tablet    Take 1 tablet (10 mg) by mouth 3 times daily    Muscle spasm, Acute pain of right shoulder       buPROPion 150 MG 12 hr tablet    WELLBUTRIN SR    180 tablet    Take 1 tablet (150 mg) by mouth 2 times daily    Anxiety, Adjustment disorder with mixed anxiety and depressed mood, Major depressive disorder, recurrent episode, moderate (H)       calcium carbonate 500 MG chewable tablet    TUMS    100 tablet    Take 1 tablet (500 mg) by mouth 2 times daily    Gastroesophageal reflux disease without esophagitis       escitalopram 10 MG tablet    LEXAPRO          fluticasone 50 MCG/ACT spray    FLONASE    16 g    Spray 1-2 sprays into both nostrils daily    Allergy to pollen       LORazepam 1 MG tablet    ATIVAN    1 tablet    Take 1 tablet (1 mg) by mouth as needed 30 minutes prior appointment    Situational anxiety       methylcellulose 500 MG Tabs tablet    CITRUCEL    30 each    Take 1 tablet (500 mg) by mouth daily    Constipation, unspecified constipation type, Gastroesophageal reflux disease without esophagitis       mometasone-formoterol 100-5 MCG/ACT oral inhaler    DULERA    1 Inhaler    Inhale 2 puffs into the lungs 2 times daily    Intermittent asthma, uncomplicated       multivitamin, therapeutic with minerals Tabs tablet     100 tablet    Take 1 tablet by mouth daily    Osteoarthritis of spine with radiculopathy, lumbar region       nitroFURantoin (macrocrystal-monohydrate) 100 MG capsule    MACROBID    14 capsule    Take 1 capsule (100 mg) by mouth 2 times daily    Urinary tract infection with hematuria, site unspecified       order for DME     1 Device    Equipment being ordered: Back brace    Chronic midline low back pain without sciatica       oxymetazoline 0.05 % spray    AFRIN NASAL SPRAY    1 Bottle    Spray 2-3 sprays  into both nostrils 2 times daily as needed for congestion    Viral upper respiratory tract infection with cough       polyethylene glycol powder    MIRALAX    510 g    Take 17 g (1 capful) by mouth daily    Blood in stool       prazosin 2 MG capsule    MINIPRESS    30 capsule    Take 1 capsule (2 mg) by mouth At Bedtime    Major depressive disorder, recurrent episode, moderate (H), Nightmares, Hallucinations, visual       * QUEtiapine 25 MG tablet    SEROquel          * QUEtiapine 300 MG tablet    SEROquel          ranitidine 300 MG tablet    ZANTAC    30 tablet    Take 1 tablet (300 mg) by mouth At Bedtime    Gastroesophageal reflux disease without esophagitis       SAPHRIS 10 MG Subl sublingual tablet   Generic drug:  asenapine           SUMAtriptan 100 MG tablet    IMITREX    18 tablet    Take 1 tablet (100 mg) by mouth at onset of headache for migraine May repeat dose in 2 hours.  Do not exceed 200 mg in 24 hours    Migraine without status migrainosus, not intractable, unspecified migraine type       traMADol 50 MG tablet    ULTRAM    90 tablet    Take 1 tablet (50 mg) by mouth every 6 hours as needed for pain maximum  4 tablet(s) per day    Cervicalgia, Acute pain of right shoulder       vitamin D 2000 UNITS tablet     100 tablet    Take 2,000 Units by mouth daily    Osteoarthritis of spine with radiculopathy, lumbar region       * Notice:  This list has 2 medication(s) that are the same as other medications prescribed for you. Read the directions carefully, and ask your doctor or other care provider to review them with you.

## 2017-07-31 NOTE — PATIENT INSTRUCTIONS
Please have the shoulder xray done.  We will call you with the results and what we should do next to evaluate or manage your pain.       Come back at your next convenience to have an establish care visit with Me (Kaylee Rodriguez) or one of the other providers.       Try the Baclofen for the muscle spasms    You should get a call from Physical therapy regarding shoulder and back pain treatment.
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
## 2017-08-04 NOTE — TELEPHONE ENCOUNTER
This was completed in result notes.    Pt was notified and referral info given to Pt.    Neftaly Flores RN

## 2017-08-07 ENCOUNTER — APPOINTMENT (OUTPATIENT)
Dept: MRI IMAGING | Facility: CLINIC | Age: 47
End: 2017-08-07
Attending: EMERGENCY MEDICINE

## 2017-08-07 ENCOUNTER — HOSPITAL ENCOUNTER (EMERGENCY)
Facility: CLINIC | Age: 47
Discharge: HOME | End: 2017-08-07
Attending: EMERGENCY MEDICINE | Admitting: EMERGENCY MEDICINE

## 2017-08-07 VITALS
TEMPERATURE: 98.6 F | SYSTOLIC BLOOD PRESSURE: 127 MMHG | HEART RATE: 83 BPM | DIASTOLIC BLOOD PRESSURE: 117 MMHG | RESPIRATION RATE: 16 BRPM | OXYGEN SATURATION: 99 % | BODY MASS INDEX: 27.64 KG/M2 | WEIGHT: 151.1 LBS

## 2017-08-07 DIAGNOSIS — M54.41 RIGHT-SIDED LOW BACK PAIN WITH RIGHT-SIDED SCIATICA, UNSPECIFIED CHRONICITY: ICD-10-CM

## 2017-08-07 DIAGNOSIS — G89.29 CHRONIC RIGHT-SIDED LOW BACK PAIN WITH RIGHT-SIDED SCIATICA: ICD-10-CM

## 2017-08-07 DIAGNOSIS — G89.29 OTHER CHRONIC PAIN: ICD-10-CM

## 2017-08-07 DIAGNOSIS — M25.511 ACUTE PAIN OF RIGHT SHOULDER: ICD-10-CM

## 2017-08-07 DIAGNOSIS — M54.41 CHRONIC RIGHT-SIDED LOW BACK PAIN WITH RIGHT-SIDED SCIATICA: ICD-10-CM

## 2017-08-07 LAB
ALBUMIN UR-MCNC: NEGATIVE MG/DL
ANION GAP SERPL CALCULATED.3IONS-SCNC: 9 MMOL/L (ref 3–14)
APPEARANCE UR: CLEAR
BACTERIA #/AREA URNS HPF: ABNORMAL /HPF
BASOPHILS # BLD AUTO: 0 10E9/L (ref 0–0.2)
BASOPHILS NFR BLD AUTO: 0.4 %
BILIRUB UR QL STRIP: NEGATIVE
BUN SERPL-MCNC: 10 MG/DL (ref 7–30)
CALCIUM SERPL-MCNC: 9.5 MG/DL (ref 8.5–10.1)
CHLORIDE SERPL-SCNC: 108 MMOL/L (ref 94–109)
CO2 SERPL-SCNC: 25 MMOL/L (ref 20–32)
COLOR UR AUTO: ABNORMAL
CREAT SERPL-MCNC: 0.77 MG/DL (ref 0.52–1.04)
CRP SERPL-MCNC: <2.9 MG/L (ref 0–8)
DIFFERENTIAL METHOD BLD: NORMAL
EOSINOPHIL # BLD AUTO: 0.1 10E9/L (ref 0–0.7)
EOSINOPHIL NFR BLD AUTO: 0.6 %
ERYTHROCYTE [DISTWIDTH] IN BLOOD BY AUTOMATED COUNT: 12.8 % (ref 10–15)
ERYTHROCYTE [SEDIMENTATION RATE] IN BLOOD BY WESTERGREN METHOD: 8 MM/H (ref 0–20)
GFR SERPL CREATININE-BSD FRML MDRD: 81 ML/MIN/1.7M2
GLUCOSE SERPL-MCNC: 87 MG/DL (ref 70–99)
GLUCOSE UR STRIP-MCNC: NEGATIVE MG/DL
HCT VFR BLD AUTO: 40.8 % (ref 35–47)
HGB BLD-MCNC: 14.3 G/DL (ref 11.7–15.7)
HGB UR QL STRIP: NEGATIVE
IMM GRANULOCYTES # BLD: 0 10E9/L (ref 0–0.4)
IMM GRANULOCYTES NFR BLD: 0.2 %
KETONES UR STRIP-MCNC: NEGATIVE MG/DL
LEUKOCYTE ESTERASE UR QL STRIP: NEGATIVE
LYMPHOCYTES # BLD AUTO: 3.9 10E9/L (ref 0.8–5.3)
LYMPHOCYTES NFR BLD AUTO: 40.4 %
MCH RBC QN AUTO: 30.2 PG (ref 26.5–33)
MCHC RBC AUTO-ENTMCNC: 35 G/DL (ref 31.5–36.5)
MCV RBC AUTO: 86 FL (ref 78–100)
MONOCYTES # BLD AUTO: 0.7 10E9/L (ref 0–1.3)
MONOCYTES NFR BLD AUTO: 6.9 %
NEUTROPHILS # BLD AUTO: 5 10E9/L (ref 1.6–8.3)
NEUTROPHILS NFR BLD AUTO: 51.5 %
NITRATE UR QL: NEGATIVE
NRBC # BLD AUTO: 0 10*3/UL
NRBC BLD AUTO-RTO: 0 /100
PH UR STRIP: 5 PH (ref 5–7)
PLATELET # BLD AUTO: 236 10E9/L (ref 150–450)
POTASSIUM SERPL-SCNC: 4.2 MMOL/L (ref 3.4–5.3)
RBC # BLD AUTO: 4.73 10E12/L (ref 3.8–5.2)
RBC #/AREA URNS AUTO: <1 /HPF (ref 0–2)
SODIUM SERPL-SCNC: 142 MMOL/L (ref 133–144)
SP GR UR STRIP: 1 (ref 1–1.03)
SQUAMOUS #/AREA URNS AUTO: 1 /HPF (ref 0–1)
URN SPEC COLLECT METH UR: ABNORMAL
UROBILINOGEN UR STRIP-MCNC: NORMAL MG/DL (ref 0–2)
WBC # BLD AUTO: 9.7 10E9/L (ref 4–11)
WBC #/AREA URNS AUTO: 1 /HPF (ref 0–2)

## 2017-08-07 PROCEDURE — 99284 EMERGENCY DEPT VISIT MOD MDM: CPT | Mod: 25 | Performed by: EMERGENCY MEDICINE

## 2017-08-07 PROCEDURE — 86140 C-REACTIVE PROTEIN: CPT | Performed by: EMERGENCY MEDICINE

## 2017-08-07 PROCEDURE — 85652 RBC SED RATE AUTOMATED: CPT | Performed by: EMERGENCY MEDICINE

## 2017-08-07 PROCEDURE — 99284 EMERGENCY DEPT VISIT MOD MDM: CPT | Mod: Z6 | Performed by: EMERGENCY MEDICINE

## 2017-08-07 PROCEDURE — 81001 URINALYSIS AUTO W/SCOPE: CPT | Performed by: EMERGENCY MEDICINE

## 2017-08-07 PROCEDURE — 25000132 ZZH RX MED GY IP 250 OP 250 PS 637: Performed by: EMERGENCY MEDICINE

## 2017-08-07 PROCEDURE — 73221 MRI JOINT UPR EXTREM W/O DYE: CPT | Mod: RT

## 2017-08-07 PROCEDURE — 72148 MRI LUMBAR SPINE W/O DYE: CPT

## 2017-08-07 PROCEDURE — 85025 COMPLETE CBC W/AUTO DIFF WBC: CPT | Performed by: EMERGENCY MEDICINE

## 2017-08-07 PROCEDURE — 80048 BASIC METABOLIC PNL TOTAL CA: CPT | Performed by: EMERGENCY MEDICINE

## 2017-08-07 RX ORDER — LIDOCAINE 50 MG/G
2 PATCH TOPICAL ONCE
Status: COMPLETED | OUTPATIENT
Start: 2017-08-07 | End: 2017-08-07

## 2017-08-07 RX ORDER — ACETAMINOPHEN 325 MG/1
650 TABLET ORAL EVERY 6 HOURS PRN
Qty: 50 TABLET | Refills: 0 | Status: SHIPPED | OUTPATIENT
Start: 2017-08-07 | End: 2018-05-02

## 2017-08-07 RX ORDER — LIDOCAINE 40 MG/G
CREAM TOPICAL
Status: DISCONTINUED | OUTPATIENT
Start: 2017-08-07 | End: 2017-08-07 | Stop reason: HOSPADM

## 2017-08-07 RX ORDER — ACETAMINOPHEN 500 MG
1000 TABLET ORAL ONCE
Status: COMPLETED | OUTPATIENT
Start: 2017-08-07 | End: 2017-08-07

## 2017-08-07 RX ORDER — LORAZEPAM 1 MG/1
1 TABLET ORAL ONCE
Status: COMPLETED | OUTPATIENT
Start: 2017-08-07 | End: 2017-08-07

## 2017-08-07 RX ORDER — LIDOCAINE 50 MG/G
OINTMENT TOPICAL 2 TIMES DAILY
Qty: 30 G | Refills: 0 | Status: SHIPPED | OUTPATIENT
Start: 2017-08-07 | End: 2018-05-02

## 2017-08-07 RX ADMIN — LIDOCAINE 2 PATCH: 50 PATCH CUTANEOUS at 21:17

## 2017-08-07 RX ADMIN — LORAZEPAM 1 MG: 1 TABLET ORAL at 18:59

## 2017-08-07 RX ADMIN — ACETAMINOPHEN 1000 MG: 500 TABLET ORAL at 18:44

## 2017-08-07 NOTE — ED AVS SNAPSHOT
[facility], Emergency Department    [address]    [facility] [place] [place] [address]    Phone:  [phone]    Fax:  [phone]                                       Phil Mckoy   MRN: [id number]    Department:  [facility], Emergency Department   Date of Visit:  8/7/2017           After Visit Summary Signature Page     I have received my discharge instructions, and my questions have been answered. I have discussed any challenges I see with this plan with the nurse or doctor.    ..........................................................................................................................................  Patient/Patient Representative Signature      ..........................................................................................................................................  Patient Representative Print Name and Relationship to Patient    ..................................................               ................................................  Date                                            Time    ..........................................................................................................................................  Reviewed by Signature/Title    ...................................................              ..............................................  Date                                                            Time
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
## 2017-08-07 NOTE — ED AVS SNAPSHOT
[facility], Emergency Department    [address]    [place] [address]    Phone:  [phone]    Fax:  [phone]                                       Phil Mckoy   MRN: [id number]    Department:  [facility], Emergency Department   Date of Visit:  8/7/2017           Patient Information     Date Of Birth          1970        Your diagnoses for this visit were:     Acute pain of right shoulder     Chronic right-sided low back pain with right-sided sciatica        You were seen by Mikayla Devlin MD.        Discharge Instructions       Thank you for coming to the [facility] Emergency Department.     Apply ice or heat to sore areas.     Keep the lidocaine patch on for the next 12 hours, then remove it. If you have improvement in your pain with the patch, fill the prescription for lidocaine ointment and apply 1-2 times daily.  Use tylenol 650mg every 6-8 hours as needed for pain.      Wear the arm sling as needed for shoulder pain. Remove it twice a day to do range of motion exercises of the right shoulder to prevent joint freezing. Please make an appointment to follow up with Orthopedics (phone: [phone]) in 10-14 days if not improving.          Future Appointments        Provider Department Dept Phone Center    8/8/2017 3:30 PM JESSIKA Farfan [facility] [phone] [facility]    8/14/2017 1:15 PM Rachel Waite MD [facility] [phone] [place]      24 Hour Appointment Hotline       To make an appointment at any [facility], call [phone] ([phone]). If you don't have a family doctor or clinic, we will help you find one. [facility] are conveniently located to serve the needs of you and your family.          ED Discharge Orders     ARM SLING L                    Review of your medicines      START taking        Dose / Directions Last dose taken     acetaminophen 325 MG tablet   Commonly known as:  TYLENOL   Dose:  650 mg   Quantity:  50 tablet        Take 2 tablets (650 mg) by mouth every 6 hours as needed for pain   Refills:  0        lidocaine 5 % ointment   Commonly known as:  XYLOCAINE   Quantity:  30 g        Apply topically 2 times daily Apply to shoulder and low back up to twice daily as needed for pain.   Refills:  0          Our records show that you are taking the medicines listed below. If these are incorrect, please call your family doctor or clinic.        Dose / Directions Last dose taken    albuterol 108 (90 BASE) MCG/ACT Inhaler   Commonly known as:  PROAIR HFA/PROVENTIL HFA/VENTOLIN HFA   Dose:  2 puff   Quantity:  1 Inhaler        Inhale 2 puffs into the lungs every 6 hours as needed for shortness of breath / dyspnea   Refills:  1        buPROPion 150 MG 12 hr tablet   Commonly known as:  WELLBUTRIN SR   Dose:  150 mg   Quantity:  180 tablet        Take 1 tablet (150 mg) by mouth 2 times daily   Refills:  1        calcium carbonate 500 MG chewable tablet   Commonly known as:  TUMS   Dose:  1 chew tab   Quantity:  100 tablet        Take 1 tablet (500 mg) by mouth 2 times daily   Refills:  3        escitalopram 10 MG tablet   Commonly known as:  LEXAPRO        Refills:  0        fluticasone 50 MCG/ACT spray   Commonly known as:  FLONASE   Dose:  1-2 spray   Quantity:  16 g        Spray 1-2 sprays into both nostrils daily   Refills:  2        LORazepam 1 MG tablet   Commonly known as:  ATIVAN   Dose:  1 mg   Quantity:  1 tablet        Take 1 tablet (1 mg) by mouth as needed 30 minutes prior appointment   Refills:  0        methylcellulose 500 MG Tabs tablet   Commonly known as:  CITRUCEL   Dose:  500 mg   Quantity:  30 each        Take 1 tablet (500 mg) by mouth daily   Refills:  3        mometasone-formoterol 100-5 MCG/ACT oral inhaler   Commonly known as:  DULERA   Dose:  2 puff   Quantity:  1 Inhaler        Inhale 2 puffs into the lungs 2  times daily   Refills:  3        multivitamin, therapeutic with minerals Tabs tablet   Dose:  1 tablet   Quantity:  100 tablet        Take 1 tablet by mouth daily   Refills:  3        nitroFURantoin (macrocrystal-monohydrate) 100 MG capsule   Commonly known as:  MACROBID   Dose:  100 mg   Quantity:  14 capsule        Take 1 capsule (100 mg) by mouth 2 times daily   Refills:  0        order for DME   Quantity:  1 Device        Equipment being ordered: Back brace   Refills:  0        oxymetazoline 0.05 % spray   Commonly known as:  AFRIN NASAL SPRAY   Dose:  2-3 spray   Quantity:  1 Bottle        Spray 2-3 sprays into both nostrils 2 times daily as needed for congestion   Refills:  0        polyethylene glycol powder   Commonly known as:  MIRALAX   Dose:  1 capful   Quantity:  510 g        Take 17 g (1 capful) by mouth daily   Refills:  11        prazosin 2 MG capsule   Commonly known as:  MINIPRESS   Dose:  2 mg   Quantity:  30 capsule        Take 1 capsule (2 mg) by mouth At Bedtime   Refills:  2        * QUEtiapine 25 MG tablet   Commonly known as:  SEROquel        Refills:  0        * QUEtiapine 300 MG tablet   Commonly known as:  SEROquel        Refills:  0        ranitidine 300 MG tablet   Commonly known as:  ZANTAC   Dose:  300 mg   Quantity:  30 tablet        Take 1 tablet (300 mg) by mouth At Bedtime   Refills:  3        SAPHRIS 10 MG Subl sublingual tablet   Generic drug:  asenapine        Refills:  0        SUMAtriptan 100 MG tablet   Commonly known as:  IMITREX   Dose:  100 mg   Quantity:  18 tablet        Take 1 tablet (100 mg) by mouth at onset of headache for migraine May repeat dose in 2 hours.  Do not exceed 200 mg in 24 hours   Refills:  3        vitamin D 2000 UNITS tablet   Dose:  2000 Units   Quantity:  100 tablet        Take 2,000 Units by mouth daily   Refills:  3        * Notice:  This list has 2 medication(s) that are the same as other medications prescribed for you. Read the directions  carefully, and ask your doctor or other care provider to review them with you.      STOP taking        Dose Reason for stopping Comments    baclofen 10 MG tablet   Commonly known as:  LIORESAL              traMADol 50 MG tablet   Commonly known as:  ULTRAM                      Prescriptions were sent or printed at these locations (2 Prescriptions)                   Other Prescriptions                Printed at Department/Unit printer (2 of 2)         acetaminophen (TYLENOL) 325 MG tablet               lidocaine (XYLOCAINE) 5 % ointment                Procedures and tests performed during your visit     Basic metabolic panel    Bladder scan    CBC with platelets differential    CRP inflammation    Erythrocyte sedimentation rate auto    Lumbar spine MRI w/o contrast    MR Shoulder Right w/o Contrast    Peripheral IV catheter    UA with Microscopic reflex to Culture      Orders Needing Specimen Collection     None      Pending Results     Date and Time Order Name Status Description    8/7/2017 1837 MR Shoulder Right w/o Contrast In process     8/7/2017 1837 Lumbar spine MRI w/o contrast Preliminary             Pending Culture Results     No orders found from 8/5/2017 to 8/8/2017.            Pending Results Instructions     If you had any lab results that were not finalized at the time of your Discharge, you can call the ED Lab Result RN at [phone]. You will be contacted by this team for any positive Lab results or changes in treatment. The nurses are available 7 days a week from 10A to 6:30P.  You can leave a message 24 hours per day and they will return your call.        Thank you for choosing [facility]       Thank you for choosing [facility] for your care. Our goal is always to provide you with excellent care. Hearing back from our patients is one way we can continue to improve our services. Please take a few minutes to complete the written survey that you may receive in the mail after you visit with us. Thank  "you!        [employer] Information     [facility] lets you send messages to your doctor, view your test results, renew your prescriptions, schedule appointments and more. To sign up, go to [email or web address] . Click on \"Log in\" on the left side of the screen, which will take you to the Welcome page. Then click on \"Sign up Now\" on the right side of the page.     You will be asked to enter the access code listed below, as well as some personal information. Please follow the directions to create your username and password.     Your access code is: [email or web address]  Expires: 10/30/2017  9:33 AM     Your access code will  in 90 days. If you need help or a new code, please call your [place] clinic or [phone].        Care EveryWhere ID     This is your Care EveryWhere ID. This could be used by other organizations to access your [place] medical records  [id number]        Equal Access to Services     [facility] : Hadii portillo bowmano Sodaryn, waaxda lubalwinderadaha, qaybta kaalmada adekriss, jose tomlin . So [facility] [phone].    ATENCIÓN: Si habla español, tiene a hayes disposición servicios gratuitos de asistencia lingüística. Llame al [phone].    We comply with applicable federal civil rights laws and [place] laws. We do not discriminate on the basis of race, color, national origin, age, disability sex, sexual orientation or gender identity.            After Visit Summary       This is your record. Keep this with you and show to your community pharmacist(s) and doctor(s) at your next visit.                  "

## 2017-08-08 ASSESSMENT — ENCOUNTER SYMPTOMS
WEAKNESS: 1
CHILLS: 0
NUMBNESS: 0
FEVER: 0
BACK PAIN: 1
WOUND: 0
NECK PAIN: 0
NECK STIFFNESS: 0

## 2017-08-08 NOTE — DISCHARGE INSTRUCTIONS
Thank you for coming to the [facility] Emergency Department.     Apply ice or heat to sore areas.     Keep the lidocaine patch on for the next 12 hours, then remove it. If you have improvement in your pain with the patch, fill the prescription for lidocaine ointment and apply 1-2 times daily.  Use tylenol 650mg every 6-8 hours as needed for pain.      Wear the arm sling as needed for shoulder pain. Remove it twice a day to do range of motion exercises of the right shoulder to prevent joint freezing. Please make an appointment to follow up with Orthopedics (phone: [phone]) in 10-14 days if not improving.

## 2017-08-11 ENCOUNTER — PRE VISIT (OUTPATIENT)
Dept: ORTHOPEDICS | Facility: CLINIC | Age: 47
End: 2017-08-11
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
## 2017-08-14 ENCOUNTER — TRANSFERRED RECORDS (OUTPATIENT)
Dept: HEALTH INFORMATION MANAGEMENT | Facility: CLINIC | Age: 47
End: 2017-08-14

## 2017-08-28 ENCOUNTER — OFFICE VISIT (OUTPATIENT)
Dept: ORTHOPEDICS | Facility: CLINIC | Age: 47
End: 2017-08-28
Payer: COMMERCIAL

## 2017-08-28 VITALS
BODY MASS INDEX: 27.31 KG/M2 | DIASTOLIC BLOOD PRESSURE: 86 MMHG | HEIGHT: 62 IN | WEIGHT: 148.4 LBS | HEART RATE: 76 BPM | SYSTOLIC BLOOD PRESSURE: 125 MMHG

## 2017-08-28 DIAGNOSIS — M25.611 STIFFNESS OF RIGHT SHOULDER JOINT: Primary | ICD-10-CM

## 2017-08-28 PROCEDURE — 99203 OFFICE O/P NEW LOW 30 MIN: CPT | Performed by: ORTHOPAEDIC SURGERY

## 2017-08-28 ASSESSMENT — PAIN SCALES - GENERAL: PAINLEVEL: SEVERE PAIN (6)

## 2017-08-28 NOTE — MR AVS SNAPSHOT
After Visit Summary   8/28/2017    Phil Mckoy    MRN: [id number]           Patient Information     Date Of Birth          1970        Visit Information        Provider Department      8/28/2017 11:00 AM Rachel Waite MD [facility]        Today's Diagnoses     Stiffness of right shoulder joint    -  1       Follow-ups after your visit        Additional Services     PHYSICAL THERAPY REFERRAL (External-Prints)       Physical Therapy Referral                  Your next 10 appointments already scheduled     Sep 07, 2017  3:00 PM CDT   New Visit with JESSIKA Agarwal CNP   [facility] ([facility])    [address]  [address]  [facility] [address] [phone]            Sep 07, 2017  3:00 PM CDT   Return Visit with Mitchel Lepe [facility] ([facility])    [address]  [address]  [facility] [address] [phone]              Who to contact     If you have questions or need follow up information about today's clinic visit or your schedule please contact [facility] directly at [phone].  Normal or non-critical lab and imaging results will be communicated to you by MyChart, letter or phone within 4 business days after the clinic has received the results. If you do not hear from us within 7 days, please contact the clinic through [employer] or phone. If you have a critical or abnormal lab result, we will notify you by phone as soon as possible.  Submit refill requests through [employer] or call your pharmacy and they will forward the refill request to us. Please allow 3 business days for your refill to be completed.          Additional Information About Your Visit        MyChart Information     [employer] is an electronic gateway that provides easy, online access to your medical records. With  "MyChart, you can request a clinic appointment, read your test results, renew a prescription or communicate with your care team.     To sign up for [employer] visit the website at [email or web address]   You will be asked to enter the access code listed below, as well as some personal information. Please follow the directions to create your username and password.     Your access code is: [email or web address]  Expires: 10/30/2017  9:33 AM     Your access code will  in 90 days. If you need help or a new code, please contact your [facility] or call [phone] for assistance.        Care EveryWhere ID     This is your Care EveryWhere ID. This could be used by other organizations to access your [place] medical records  [id number]        Your Vitals Were     Pulse Height BMI (Body Mass Index)             76 1.57 m (5' 1.81\") 27.31 kg/m2          Blood Pressure from Last 3 Encounters:   17 125/86   17 (!) 127/117   17 102/60    Weight from Last 3 Encounters:   17 67.3 kg (148 lb 6.4 oz)   17 68.5 kg (151 lb 1.6 oz)   17 67.8 kg (149 lb 8 oz)              We Performed the Following     PHYSICAL THERAPY REFERRAL (External-Prints)        Primary Care Provider Office Phone # Fax #    [facility] [phone]       [address] [address]        Equal Access to Services     [place] : Hadii portillo claros hadasho Sowestleyali, waaxda luqadaha, qaybta kaalmada adeegyada, jose salazar. So [facility] [phone].    ATENCIÓN: Si habla español, tiene a hayes disposición servicios gratuitos de asistencia lingüística. Llame al [phone].    We comply with applicable federal civil rights laws and [place] laws. We do not discriminate on the basis of race, color, national origin, age, disability sex, sexual orientation or gender identity.            Thank you!     Thank you for choosing [facility] " [facility]  for your care. Our goal is always to provide you with excellent care. Hearing back from our patients is one way we can continue to improve our services. Please take a few minutes to complete the written survey that you may receive in the mail after your visit with us. Thank you!             Your Updated Medication List - Protect others around you: Learn how to safely use, store and throw away your medicines at www.disposemymeds.org.          This list is accurate as of: 8/28/17 11:41 AM.  Always use your most recent med list.                   Brand Name Dispense Instructions for use Diagnosis    acetaminophen 325 MG tablet    TYLENOL    50 tablet    Take 2 tablets (650 mg) by mouth every 6 hours as needed for pain        albuterol 108 (90 BASE) MCG/ACT Inhaler    PROAIR HFA/PROVENTIL HFA/VENTOLIN HFA    1 Inhaler    Inhale 2 puffs into the lungs every 6 hours as needed for shortness of breath / dyspnea    Intermittent asthma, uncomplicated       buPROPion 150 MG 12 hr tablet    WELLBUTRIN SR    180 tablet    Take 1 tablet (150 mg) by mouth 2 times daily    Anxiety, Adjustment disorder with mixed anxiety and depressed mood, Major depressive disorder, recurrent episode, moderate (H)       calcium carbonate 500 MG chewable tablet    TUMS    100 tablet    Take 1 tablet (500 mg) by mouth 2 times daily    Gastroesophageal reflux disease without esophagitis       escitalopram 10 MG tablet    LEXAPRO          fluticasone 50 MCG/ACT spray    FLONASE    16 g    Spray 1-2 sprays into both nostrils daily    Allergy to pollen       lidocaine 5 % ointment    XYLOCAINE    30 g    Apply topically 2 times daily Apply to shoulder and low back up to twice daily as needed for pain.        LORazepam 1 MG tablet    ATIVAN    1 tablet    Take 1 tablet (1 mg) by mouth as needed 30 minutes prior appointment    Situational anxiety       methylcellulose 500 MG Tabs tablet    CITRUCEL    30 each    Take 1 tablet (500 mg)  by mouth daily    Constipation, unspecified constipation type, Gastroesophageal reflux disease without esophagitis       mometasone-formoterol 100-5 MCG/ACT oral inhaler    DULERA    1 Inhaler    Inhale 2 puffs into the lungs 2 times daily    Intermittent asthma, uncomplicated       multivitamin, therapeutic with minerals Tabs tablet     100 tablet    Take 1 tablet by mouth daily    Osteoarthritis of spine with radiculopathy, lumbar region       nitroFURantoin (macrocrystal-monohydrate) 100 MG capsule    MACROBID    14 capsule    Take 1 capsule (100 mg) by mouth 2 times daily    Urinary tract infection with hematuria, site unspecified       order for DME     1 Device    Equipment being ordered: Back brace    Chronic midline low back pain without sciatica       oxymetazoline 0.05 % spray    AFRIN NASAL SPRAY    1 Bottle    Spray 2-3 sprays into both nostrils 2 times daily as needed for congestion    Viral upper respiratory tract infection with cough       polyethylene glycol powder    MIRALAX    510 g    Take 17 g (1 capful) by mouth daily    Blood in stool       prazosin 2 MG capsule    MINIPRESS    30 capsule    Take 1 capsule (2 mg) by mouth At Bedtime    Major depressive disorder, recurrent episode, moderate (H), Nightmares, Hallucinations, visual       * QUEtiapine 25 MG tablet    SEROquel          * QUEtiapine 300 MG tablet    SEROquel          ranitidine 300 MG tablet    ZANTAC    30 tablet    Take 1 tablet (300 mg) by mouth At Bedtime    Gastroesophageal reflux disease without esophagitis       SAPHRIS 10 MG Subl sublingual tablet   Generic drug:  asenapine           SUMAtriptan 100 MG tablet    IMITREX    18 tablet    Take 1 tablet (100 mg) by mouth at onset of headache for migraine May repeat dose in 2 hours.  Do not exceed 200 mg in 24 hours    Migraine without status migrainosus, not intractable, unspecified migraine type       vitamin D 2000 UNITS tablet     100 tablet    Take 2,000 Units by mouth daily     Osteoarthritis of spine with radiculopathy, lumbar region       * Notice:  This list has 2 medication(s) that are the same as other medications prescribed for you. Read the directions carefully, and ask your doctor or other care provider to review them with you.
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
## 2017-08-28 NOTE — NURSING NOTE
"Phil Mckoy's goals for this visit include:   Chief Complaint   Patient presents with     Shoulder Pain     Right        She requests these members of her care team be copied on today's visit information: yes    PCP: [facility]    Referring Provider:  JESSIKA Miguel CNP  [address]  [place] [address]    Chief Complaint   Patient presents with     Shoulder Pain     Right        Initial /86  Pulse 76  Ht 1.57 m (5' 1.81\")  Wt 67.3 kg (148 lb 6.4 oz)  BMI 27.31 kg/m2 Estimated body mass index is 27.31 kg/(m^2) as calculated from the following:    Height as of this encounter: 1.57 m (5' 1.81\").    Weight as of this encounter: 67.3 kg (148 lb 6.4 oz).  Medication Reconciliation: complete    Do you need any medication refills at today's visit? David Parra CMA        "

## 2017-09-18 ENCOUNTER — OFFICE VISIT (OUTPATIENT)
Dept: FAMILY MEDICINE | Facility: CLINIC | Age: 47
End: 2017-09-18
Payer: COMMERCIAL

## 2017-09-18 ENCOUNTER — TELEPHONE (OUTPATIENT)
Dept: FAMILY MEDICINE | Facility: CLINIC | Age: 47
End: 2017-09-18

## 2017-09-18 ENCOUNTER — HOSPITAL ENCOUNTER (OUTPATIENT)
Dept: GENERAL RADIOLOGY | Facility: CLINIC | Age: 47
Discharge: HOME | End: 2017-09-18
Attending: NURSE PRACTITIONER | Admitting: NURSE PRACTITIONER
Payer: COMMERCIAL

## 2017-09-18 VITALS
BODY MASS INDEX: 26.32 KG/M2 | HEART RATE: 87 BPM | WEIGHT: 143 LBS | RESPIRATION RATE: 14 BRPM | OXYGEN SATURATION: 96 % | TEMPERATURE: 98.6 F | SYSTOLIC BLOOD PRESSURE: 130 MMHG | DIASTOLIC BLOOD PRESSURE: 74 MMHG

## 2017-09-18 DIAGNOSIS — J45.20 INTERMITTENT ASTHMA, UNCOMPLICATED: ICD-10-CM

## 2017-09-18 DIAGNOSIS — F33.1 MAJOR DEPRESSIVE DISORDER, RECURRENT EPISODE, MODERATE (H): ICD-10-CM

## 2017-09-18 DIAGNOSIS — M25.561 ACUTE PAIN OF RIGHT KNEE: Primary | ICD-10-CM

## 2017-09-18 PROCEDURE — 73562 X-RAY EXAM OF KNEE 3: CPT | Mod: RT

## 2017-09-18 PROCEDURE — 99214 OFFICE O/P EST MOD 30 MIN: CPT | Performed by: NURSE PRACTITIONER

## 2017-09-18 NOTE — TELEPHONE ENCOUNTER
Phil Mckoy is a 46 year old female who calls with R knee pain, swelling, Decreased ROM.  Pt needs to use cane and doesn't normally.  Red and hot.     Pt has no swelling above or below knee.  Pt has no red streaks, no Hx of knee surgery, no fever/chills.    NURSING ASSESSMENT:  Description:  Red, warm, swollen, painful.    Onset/duration:  A few days  Precip. factors:  None  Associated symptoms:  Up inside of Right thigh  Improves/worsens symptoms:  Nothing helps  Pain scale (0-10)   8/10  LMP/preg/breast feeding:  N/A  Last exam/Treatment:  7/31/17  Allergies:   Allergies   Allergen Reactions     Compazine      Unsure of rxn     Erythromycin Swelling     Gabapentin      Patient reported tremor and ??seizure like activity     Penicillins Swelling     Sulfa Drugs Hives       MEDICATIONS:   Taking medication(s) as prescribed? Yes  Taking over the counter medication(s?) Yes  Any medication side effects? No significant side effects    Any barriers to taking medication(s) as prescribed?  No  Medication(s) improving/managing symptoms?  No  Medication reconciliation completed: No      NURSING PLAN: Nursing advice to patient Appt scheduled today at 4 pm    RECOMMENDED DISPOSITION:  See in 24 hours - Scheduled with Fozia at 4 pm  Will comply with recommendation: Yes  If further questions/concerns or if symptoms do not improve, worsen or new symptoms develop, call your PCP or [facility] as soon as possible.      Guideline used:  Nursing judgement    Neftaly Flores RN

## 2017-09-18 NOTE — NURSING NOTE
"Chief Complaint   Patient presents with     Pain       Initial /74  Pulse 87  Temp 98.6  F (37  C) (Oral)  Resp 14  Wt 143 lb (64.9 kg)  SpO2 96%  BMI 26.32 kg/m2 Estimated body mass index is 26.32 kg/(m^2) as calculated from the following:    Height as of 8/28/17: 5' 1.81\" (1.57 m).    Weight as of this encounter: 143 lb (64.9 kg).  Medication Reconciliation: complete     Alaina Montoya MA      "

## 2017-09-18 NOTE — MR AVS SNAPSHOT
After Visit Summary   9/18/2017    Phil Mckoy    MRN: [id number]           Patient Information     Date Of Birth          1970        Visit Information        Provider Department      9/18/2017 4:00 PM Fozia Villar APRN CNP [facility]        Today's Diagnoses     Need for prophylactic vaccination and inoculation against influenza    -  1    Acute pain of right knee          Care Instructions    -Knee x-ray today.   -Call clinic with any questions or concerns.             Follow-ups after your visit        Your next 10 appointments already scheduled     Sep 28, 2017 11:00 AM CDT   New Visit with JESSIKA Agarwal CNP   [facility] ([facility])    [address]  [address]  [facility] [id number] [phone]            Sep 28, 2017 11:30 AM CDT   Return Visit with Mitchel Lepe [facility] ([facility])    [address]  [address]  [facility] [id number] [phone]              Who to contact     If you have questions or need follow up information about today's clinic visit or your schedule please contact [facility] directly at [phone].  Normal or non-critical lab and imaging results will be communicated to you by Skipohart, letter or phone within 4 business days after the clinic has received the results. If you do not hear from us within 7 days, please contact the clinic through Skipohart or phone. If you have a critical or abnormal lab result, we will notify you by phone as soon as possible.  Submit refill requests through [employer] or call your pharmacy and they will forward the refill request to us. Please allow 3 business days for your refill to be completed.          Additional Information About Your Visit        SkipoharDatadog Information     [employer] lets you send  "messages to your doctor, view your test results, renew your prescriptions, schedule appointments and more. To sign up, go to [email or web address] . Click on \"Log in\" on the left side of the screen, which will take you to the Welcome page. Then click on \"Sign up Now\" on the right side of the page.     You will be asked to enter the access code listed below, as well as some personal information. Please follow the directions to create your username and password.     Your access code is: [email or web address]  Expires: 10/30/2017  9:33 AM     Your access code will  in 90 days. If you need help or a new code, please call your [place] clinic or [phone].        Care EveryWhere ID     This is your [facility] EveryWhere ID. This could be used by other organizations to access your [facility] medical records  [id number]        Your Vitals Were     Pulse Temperature Respirations Pulse Oximetry BMI (Body Mass Index)       87 98.6  F (37  C) (Oral) 14 96% 26.32 kg/m2        Blood Pressure from Last 3 Encounters:   17 130/74   17 125/86   17 (!) 127/117    Weight from Last 3 Encounters:   17 143 lb (64.9 kg)   17 148 lb 6.4 oz (67.3 kg)   17 151 lb 1.6 oz (68.5 kg)              We Performed the Following     XR Knee Right 3 Views        Primary Care Provider Office Phone # Fax #    [facility] [phone]       [address] [address]        Equal Access to Services     SKINNY MCCAIN AH: Hadreuben Bentley, raymundo gilman, jose roach. So [facility] [phone].    ATENCIÓN: Si habla español, tiene a hayes disposición servicios gratuitos de asistencia lingüística. Brigette talbot [phone].    We comply with applicable federal civil rights laws and [place] laws. We do not discriminate on the basis of race, color, national origin, age, disability sex, sexual orientation or gender " identity.            Thank you!     Thank you for choosing [facility]  for your care. Our goal is always to provide you with excellent care. Hearing back from our patients is one way we can continue to improve our services. Please take a few minutes to complete the written survey that you may receive in the mail after your visit with us. Thank you!             Your Updated Medication List - Protect others around you: Learn how to safely use, store and throw away your medicines at www.disposemymeds.org.          This list is accurate as of: 9/18/17  4:35 PM.  Always use your most recent med list.                   Brand Name Dispense Instructions for use Diagnosis    acetaminophen 325 MG tablet    TYLENOL    50 tablet    Take 2 tablets (650 mg) by mouth every 6 hours as needed for pain        albuterol 108 (90 BASE) MCG/ACT Inhaler    PROAIR HFA/PROVENTIL HFA/VENTOLIN HFA    1 Inhaler    Inhale 2 puffs into the lungs every 6 hours as needed for shortness of breath / dyspnea    Intermittent asthma, uncomplicated       buPROPion 150 MG 12 hr tablet    WELLBUTRIN SR    180 tablet    Take 1 tablet (150 mg) by mouth 2 times daily    Anxiety, Adjustment disorder with mixed anxiety and depressed mood, Major depressive disorder, recurrent episode, moderate (H)       calcium carbonate 500 MG chewable tablet    TUMS    100 tablet    Take 1 tablet (500 mg) by mouth 2 times daily    Gastroesophageal reflux disease without esophagitis       escitalopram 10 MG tablet    LEXAPRO          fluticasone 50 MCG/ACT spray    FLONASE    16 g    Spray 1-2 sprays into both nostrils daily    Allergy to pollen       lidocaine 5 % ointment    XYLOCAINE    30 g    Apply topically 2 times daily Apply to shoulder and low back up to twice daily as needed for pain.        LORazepam 1 MG tablet    ATIVAN    1 tablet    Take 1 tablet (1 mg) by mouth as needed 30 minutes prior appointment    Situational anxiety        methylcellulose 500 MG Tabs tablet    CITRUCEL    30 each    Take 1 tablet (500 mg) by mouth daily    Constipation, unspecified constipation type, Gastroesophageal reflux disease without esophagitis       mometasone-formoterol 100-5 MCG/ACT oral inhaler    DULERA    1 Inhaler    Inhale 2 puffs into the lungs 2 times daily    Intermittent asthma, uncomplicated       multivitamin, therapeutic with minerals Tabs tablet     100 tablet    Take 1 tablet by mouth daily    Osteoarthritis of spine with radiculopathy, lumbar region       nitroFURantoin (macrocrystal-monohydrate) 100 MG capsule    MACROBID    14 capsule    Take 1 capsule (100 mg) by mouth 2 times daily    Urinary tract infection with hematuria, site unspecified       order for DME     1 Device    Equipment being ordered: Back brace    Chronic midline low back pain without sciatica       oxymetazoline 0.05 % spray    AFRIN NASAL SPRAY    1 Bottle    Spray 2-3 sprays into both nostrils 2 times daily as needed for congestion    Viral upper respiratory tract infection with cough       polyethylene glycol powder    MIRALAX    510 g    Take 17 g (1 capful) by mouth daily    Blood in stool       prazosin 2 MG capsule    MINIPRESS    30 capsule    Take 1 capsule (2 mg) by mouth At Bedtime    Major depressive disorder, recurrent episode, moderate (H), Nightmares, Hallucinations, visual       * QUEtiapine 25 MG tablet    SEROquel          * QUEtiapine 300 MG tablet    SEROquel          ranitidine 300 MG tablet    ZANTAC    30 tablet    Take 1 tablet (300 mg) by mouth At Bedtime    Gastroesophageal reflux disease without esophagitis       SAPHRIS 10 MG Subl sublingual tablet   Generic drug:  asenapine           SUMAtriptan 100 MG tablet    IMITREX    18 tablet    Take 1 tablet (100 mg) by mouth at onset of headache for migraine May repeat dose in 2 hours.  Do not exceed 200 mg in 24 hours    Migraine without status migrainosus, not intractable, unspecified migraine type        vitamin D 2000 UNITS tablet     100 tablet    Take 2,000 Units by mouth daily    Osteoarthritis of spine with radiculopathy, lumbar region       * Notice:  This list has 2 medication(s) that are the same as other medications prescribed for you. Read the directions carefully, and ask your doctor or other care provider to review them with you.
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
## 2017-09-19 ENCOUNTER — TELEPHONE (OUTPATIENT)
Dept: FAMILY MEDICINE | Facility: CLINIC | Age: 47
End: 2017-09-19

## 2017-09-19 ASSESSMENT — ASTHMA QUESTIONNAIRES: ACT_TOTALSCORE: 25

## 2017-09-19 NOTE — TELEPHONE ENCOUNTER
Please call patient with right knee x-ray which shows no evidence of fracture, degeneration process.     Recommend patient to try physical therapy-this has been ordered; and to follow-up with Carol (PCP).    JESSIKA Mckeon CNP

## 2017-09-20 ENCOUNTER — HOSPITAL ENCOUNTER (EMERGENCY)
Facility: CLINIC | Age: 47
Discharge: HOME | End: 2017-09-20
Attending: FAMILY MEDICINE | Admitting: FAMILY MEDICINE
Payer: COMMERCIAL

## 2017-09-20 VITALS
HEIGHT: 62 IN | WEIGHT: 145.2 LBS | DIASTOLIC BLOOD PRESSURE: 67 MMHG | HEART RATE: 92 BPM | BODY MASS INDEX: 26.72 KG/M2 | RESPIRATION RATE: 18 BRPM | OXYGEN SATURATION: 98 % | SYSTOLIC BLOOD PRESSURE: 108 MMHG | TEMPERATURE: 99.1 F

## 2017-09-20 DIAGNOSIS — J45.901 ASTHMA EXACERBATION: ICD-10-CM

## 2017-09-20 DIAGNOSIS — N39.0 URINARY TRACT INFECTION WITHOUT HEMATURIA, SITE UNSPECIFIED: ICD-10-CM

## 2017-09-20 DIAGNOSIS — J01.00 ACUTE NON-RECURRENT MAXILLARY SINUSITIS: ICD-10-CM

## 2017-09-20 LAB
ALBUMIN SERPL-MCNC: 3.8 G/DL (ref 3.4–5)
ALBUMIN UR-MCNC: 10 MG/DL
ALP SERPL-CCNC: 146 U/L (ref 40–150)
ALT SERPL W P-5'-P-CCNC: 32 U/L (ref 0–50)
AMORPH CRY #/AREA URNS HPF: ABNORMAL /HPF
ANION GAP SERPL CALCULATED.3IONS-SCNC: 9 MMOL/L (ref 3–14)
APPEARANCE UR: ABNORMAL
AST SERPL W P-5'-P-CCNC: 21 U/L (ref 0–45)
BACTERIA #/AREA URNS HPF: ABNORMAL /HPF
BASOPHILS # BLD AUTO: 0 10E9/L (ref 0–0.2)
BASOPHILS NFR BLD AUTO: 0.3 %
BILIRUB SERPL-MCNC: 0.3 MG/DL (ref 0.2–1.3)
BILIRUB UR QL STRIP: NEGATIVE
BUN SERPL-MCNC: 11 MG/DL (ref 7–30)
CALCIUM SERPL-MCNC: 8.7 MG/DL (ref 8.5–10.1)
CHLORIDE SERPL-SCNC: 112 MMOL/L (ref 94–109)
CO2 SERPL-SCNC: 26 MMOL/L (ref 20–32)
COLOR UR AUTO: YELLOW
CREAT SERPL-MCNC: 0.9 MG/DL (ref 0.52–1.04)
DIFFERENTIAL METHOD BLD: NORMAL
EOSINOPHIL # BLD AUTO: 0.1 10E9/L (ref 0–0.7)
EOSINOPHIL NFR BLD AUTO: 1.3 %
ERYTHROCYTE [DISTWIDTH] IN BLOOD BY AUTOMATED COUNT: 13 % (ref 10–15)
FLUAV+FLUBV RNA SPEC QL NAA+PROBE: NEGATIVE
FLUAV+FLUBV RNA SPEC QL NAA+PROBE: NEGATIVE
GFR SERPL CREATININE-BSD FRML MDRD: 68 ML/MIN/1.7M2
GLUCOSE SERPL-MCNC: 96 MG/DL (ref 70–99)
GLUCOSE UR STRIP-MCNC: NEGATIVE MG/DL
HCT VFR BLD AUTO: 40.2 % (ref 35–47)
HGB BLD-MCNC: 13.5 G/DL (ref 11.7–15.7)
HGB UR QL STRIP: NEGATIVE
IMM GRANULOCYTES # BLD: 0 10E9/L (ref 0–0.4)
IMM GRANULOCYTES NFR BLD: 0 %
KETONES UR STRIP-MCNC: NEGATIVE MG/DL
LEUKOCYTE ESTERASE UR QL STRIP: ABNORMAL
LYMPHOCYTES # BLD AUTO: 2.8 10E9/L (ref 0.8–5.3)
LYMPHOCYTES NFR BLD AUTO: 35.7 %
MCH RBC QN AUTO: 29.4 PG (ref 26.5–33)
MCHC RBC AUTO-ENTMCNC: 33.6 G/DL (ref 31.5–36.5)
MCV RBC AUTO: 88 FL (ref 78–100)
MONOCYTES # BLD AUTO: 0.9 10E9/L (ref 0–1.3)
MONOCYTES NFR BLD AUTO: 12 %
MUCOUS THREADS #/AREA URNS LPF: PRESENT /LPF
NEUTROPHILS # BLD AUTO: 4 10E9/L (ref 1.6–8.3)
NEUTROPHILS NFR BLD AUTO: 50.7 %
NITRATE UR QL: NEGATIVE
NRBC # BLD AUTO: 0 10*3/UL
NRBC BLD AUTO-RTO: 0 /100
PH UR STRIP: 7 PH (ref 5–7)
PLATELET # BLD AUTO: 217 10E9/L (ref 150–450)
POTASSIUM SERPL-SCNC: 3.8 MMOL/L (ref 3.4–5.3)
PROT SERPL-MCNC: 7.6 G/DL (ref 6.8–8.8)
RBC # BLD AUTO: 4.59 10E12/L (ref 3.8–5.2)
RBC #/AREA URNS AUTO: 4 /HPF (ref 0–2)
RSV RNA SPEC NAA+PROBE: NEGATIVE
SODIUM SERPL-SCNC: 147 MMOL/L (ref 133–144)
SOURCE: ABNORMAL
SP GR UR STRIP: 1.02 (ref 1–1.03)
SPECIMEN SOURCE: NORMAL
SQUAMOUS #/AREA URNS AUTO: 7 /HPF (ref 0–1)
UROBILINOGEN UR STRIP-MCNC: 4 MG/DL (ref 0–2)
WBC # BLD AUTO: 7.8 10E9/L (ref 4–11)
WBC #/AREA URNS AUTO: 80 /HPF (ref 0–2)

## 2017-09-20 PROCEDURE — 96361 HYDRATE IV INFUSION ADD-ON: CPT | Performed by: FAMILY MEDICINE

## 2017-09-20 PROCEDURE — 87186 SC STD MICRODIL/AGAR DIL: CPT | Performed by: FAMILY MEDICINE

## 2017-09-20 PROCEDURE — 25000132 ZZH RX MED GY IP 250 OP 250 PS 637: Performed by: STUDENT IN AN ORGANIZED HEALTH CARE EDUCATION/TRAINING PROGRAM

## 2017-09-20 PROCEDURE — 81001 URINALYSIS AUTO W/SCOPE: CPT | Performed by: STUDENT IN AN ORGANIZED HEALTH CARE EDUCATION/TRAINING PROGRAM

## 2017-09-20 PROCEDURE — 25000125 ZZHC RX 250: Performed by: STUDENT IN AN ORGANIZED HEALTH CARE EDUCATION/TRAINING PROGRAM

## 2017-09-20 PROCEDURE — 99284 EMERGENCY DEPT VISIT MOD MDM: CPT | Mod: 25 | Performed by: FAMILY MEDICINE

## 2017-09-20 PROCEDURE — 87086 URINE CULTURE/COLONY COUNT: CPT | Performed by: FAMILY MEDICINE

## 2017-09-20 PROCEDURE — 87088 URINE BACTERIA CULTURE: CPT | Performed by: FAMILY MEDICINE

## 2017-09-20 PROCEDURE — 80053 COMPREHEN METABOLIC PANEL: CPT | Performed by: STUDENT IN AN ORGANIZED HEALTH CARE EDUCATION/TRAINING PROGRAM

## 2017-09-20 PROCEDURE — 94640 AIRWAY INHALATION TREATMENT: CPT | Performed by: FAMILY MEDICINE

## 2017-09-20 PROCEDURE — 87631 RESP VIRUS 3-5 TARGETS: CPT | Performed by: STUDENT IN AN ORGANIZED HEALTH CARE EDUCATION/TRAINING PROGRAM

## 2017-09-20 PROCEDURE — 25000128 H RX IP 250 OP 636: Performed by: STUDENT IN AN ORGANIZED HEALTH CARE EDUCATION/TRAINING PROGRAM

## 2017-09-20 PROCEDURE — 96374 THER/PROPH/DIAG INJ IV PUSH: CPT | Performed by: FAMILY MEDICINE

## 2017-09-20 PROCEDURE — 85025 COMPLETE CBC W/AUTO DIFF WBC: CPT | Performed by: STUDENT IN AN ORGANIZED HEALTH CARE EDUCATION/TRAINING PROGRAM

## 2017-09-20 PROCEDURE — 99284 EMERGENCY DEPT VISIT MOD MDM: CPT | Mod: GC | Performed by: FAMILY MEDICINE

## 2017-09-20 RX ORDER — CIPROFLOXACIN 500 MG/1
500 TABLET, FILM COATED ORAL DAILY
Qty: 9 TABLET | Refills: 0 | Status: SHIPPED | OUTPATIENT
Start: 2017-09-20 | End: 2017-09-22 | Stop reason: ALTCHOICE

## 2017-09-20 RX ORDER — LIDOCAINE 40 MG/G
CREAM TOPICAL
Status: DISCONTINUED | OUTPATIENT
Start: 2017-09-20 | End: 2017-09-21 | Stop reason: HOSPADM

## 2017-09-20 RX ORDER — ONDANSETRON 2 MG/ML
4 INJECTION INTRAMUSCULAR; INTRAVENOUS
Status: COMPLETED | OUTPATIENT
Start: 2017-09-20 | End: 2017-09-20

## 2017-09-20 RX ORDER — SODIUM CHLORIDE 9 MG/ML
1000 INJECTION, SOLUTION INTRAVENOUS CONTINUOUS
Status: DISCONTINUED | OUTPATIENT
Start: 2017-09-20 | End: 2017-09-21 | Stop reason: HOSPADM

## 2017-09-20 RX ORDER — PREDNISONE 20 MG/1
40 TABLET ORAL ONCE
Status: COMPLETED | OUTPATIENT
Start: 2017-09-20 | End: 2017-09-20

## 2017-09-20 RX ORDER — CIPROFLOXACIN 500 MG/1
500 TABLET, FILM COATED ORAL ONCE
Status: COMPLETED | OUTPATIENT
Start: 2017-09-20 | End: 2017-09-20

## 2017-09-20 RX ORDER — METHYLPREDNISOLONE 4 MG
TABLET, DOSE PACK ORAL
Qty: 21 TABLET | Refills: 0 | Status: SHIPPED | OUTPATIENT
Start: 2017-09-20 | End: 2018-05-02

## 2017-09-20 RX ORDER — IPRATROPIUM BROMIDE AND ALBUTEROL SULFATE 2.5; .5 MG/3ML; MG/3ML
3 SOLUTION RESPIRATORY (INHALATION) ONCE
Status: COMPLETED | OUTPATIENT
Start: 2017-09-20 | End: 2017-09-20

## 2017-09-20 RX ADMIN — ONDANSETRON 4 MG: 2 INJECTION INTRAMUSCULAR; INTRAVENOUS at 20:16

## 2017-09-20 RX ADMIN — PREDNISONE 40 MG: 20 TABLET ORAL at 23:35

## 2017-09-20 RX ADMIN — IPRATROPIUM BROMIDE AND ALBUTEROL SULFATE 3 ML: .5; 3 SOLUTION RESPIRATORY (INHALATION) at 20:14

## 2017-09-20 RX ADMIN — SODIUM CHLORIDE 1000 ML: 9 INJECTION, SOLUTION INTRAVENOUS at 21:38

## 2017-09-20 RX ADMIN — CIPROFLOXACIN HYDROCHLORIDE 500 MG: 500 TABLET, FILM COATED ORAL at 22:41

## 2017-09-20 RX ADMIN — SODIUM CHLORIDE 1000 ML: 9 INJECTION, SOLUTION INTRAVENOUS at 20:15

## 2017-09-20 NOTE — ED AVS SNAPSHOT
[facility], Emergency Department    [address]    [place] [address]    Phone:  [phone]    Fax:  [phone]                                       Phil Mckoy   MRN: [id number]    Department:  [facility], Emergency Department   Date of Visit:  9/20/2017           After Visit Summary Signature Page     I have received my discharge instructions, and my questions have been answered. I have discussed any challenges I see with this plan with the nurse or doctor.    ..........................................................................................................................................  Patient/Patient Representative Signature      ..........................................................................................................................................  Patient Representative Print Name and Relationship to Patient    ..................................................               ................................................  Date                                            Time    ..........................................................................................................................................  Reviewed by Signature/Title    ...................................................              ..............................................  Date                                                            Time

## 2017-09-20 NOTE — ED AVS SNAPSHOT
[facility], Emergency Department    [address]    [place] [address]    Phone:  [phone]    Fax:  [phone]                                       Phil Mckoy   MRN: [id number]    Department:  [facility], Emergency Department   Date of Visit:  9/20/2017           Patient Information     Date Of Birth          1970        Your diagnoses for this visit were:     None       You were seen by Joseph Cm MD.        Discharge Instructions       Please make an appointment to follow up with Your Primary Care Provider in 7 days if not improving.    1. Take medrol dose tricia as prescribed.  2. Use albuterol nebulizer at home as needed for wheezing.  3. Take new antibiotic, ciprofloxacin once daily for 9 days.       Future Appointments        Provider Department Dept Phone Center    9/22/2017 11:00 AM JESSIKA Farfan [facility] Primary [facility] [phone] [facility]    9/22/2017 11:00 AM EFREN Ponce [place] [phone] [facility]      24 Hour Appointment Hotline       To make an appointment at any [facility], call [phone] ([phone]). If you don't have a family doctor or clinic, we will help you find one. [facility] are conveniently located to serve the needs of you and your family.             Review of your medicines      START taking        Dose / Directions Last dose taken    ciprofloxacin 500 MG tablet   Commonly known as:  CIPRO   Dose:  500 mg   Quantity:  9 tablet        Take 1 tablet (500 mg) by mouth daily for 9 days   Refills:  0        methylPREDNISolone 4 MG tablet   Commonly known as:  MEDROL DOSEPAK   Quantity:  21 tablet        Follow package instructions   Refills:  0          Our records show that you are taking the medicines listed below. If these are incorrect, please call your family doctor or clinic.        Dose / Directions Last dose taken    acetaminophen 325 MG tablet    Commonly known as:  TYLENOL   Dose:  650 mg   Quantity:  50 tablet        Take 2 tablets (650 mg) by mouth every 6 hours as needed for pain   Refills:  0        albuterol 108 (90 BASE) MCG/ACT Inhaler   Commonly known as:  PROAIR HFA/PROVENTIL HFA/VENTOLIN HFA   Dose:  2 puff   Quantity:  1 Inhaler        Inhale 2 puffs into the lungs every 6 hours as needed for shortness of breath / dyspnea   Refills:  1        buPROPion 150 MG 12 hr tablet   Commonly known as:  WELLBUTRIN SR   Dose:  150 mg   Quantity:  180 tablet        Take 1 tablet (150 mg) by mouth 2 times daily   Refills:  1        calcium carbonate 500 MG chewable tablet   Commonly known as:  TUMS   Dose:  1 chew tab   Quantity:  100 tablet        Take 1 tablet (500 mg) by mouth 2 times daily   Refills:  3        escitalopram 10 MG tablet   Commonly known as:  LEXAPRO        Refills:  0        fluticasone 50 MCG/ACT spray   Commonly known as:  FLONASE   Dose:  1-2 spray   Quantity:  16 g        Spray 1-2 sprays into both nostrils daily   Refills:  2        lidocaine 5 % ointment   Commonly known as:  XYLOCAINE   Quantity:  30 g        Apply topically 2 times daily Apply to shoulder and low back up to twice daily as needed for pain.   Refills:  0        LORazepam 1 MG tablet   Commonly known as:  ATIVAN   Dose:  1 mg   Quantity:  1 tablet        Take 1 tablet (1 mg) by mouth as needed 30 minutes prior appointment   Refills:  0        methylcellulose 500 MG Tabs tablet   Commonly known as:  CITRUCEL   Dose:  500 mg   Quantity:  30 each        Take 1 tablet (500 mg) by mouth daily   Refills:  3        mometasone-formoterol 100-5 MCG/ACT oral inhaler   Commonly known as:  DULERA   Dose:  2 puff   Quantity:  1 Inhaler        Inhale 2 puffs into the lungs 2 times daily   Refills:  3        multivitamin, therapeutic with minerals Tabs tablet   Dose:  1 tablet   Quantity:  100 tablet        Take 1 tablet by mouth daily   Refills:  3        nitroFURantoin  (macrocrystal-monohydrate) 100 MG capsule   Commonly known as:  MACROBID   Dose:  100 mg   Quantity:  14 capsule        Take 1 capsule (100 mg) by mouth 2 times daily   Refills:  0        order for DME   Quantity:  1 Device        Equipment being ordered: Back brace   Refills:  0        oxymetazoline 0.05 % spray   Commonly known as:  AFRIN NASAL SPRAY   Dose:  2-3 spray   Quantity:  1 Bottle        Spray 2-3 sprays into both nostrils 2 times daily as needed for congestion   Refills:  0        polyethylene glycol powder   Commonly known as:  MIRALAX   Dose:  1 capful   Quantity:  510 g        Take 17 g (1 capful) by mouth daily   Refills:  11        prazosin 2 MG capsule   Commonly known as:  MINIPRESS   Dose:  2 mg   Quantity:  30 capsule        Take 1 capsule (2 mg) by mouth At Bedtime   Refills:  2        * QUEtiapine 25 MG tablet   Commonly known as:  SEROquel        Refills:  0        * QUEtiapine 300 MG tablet   Commonly known as:  SEROquel        Refills:  0        ranitidine 300 MG tablet   Commonly known as:  ZANTAC   Dose:  300 mg   Quantity:  30 tablet        Take 1 tablet (300 mg) by mouth At Bedtime   Refills:  3        SAPHRIS 10 MG Subl sublingual tablet   Generic drug:  asenapine        Refills:  0        SUMAtriptan 100 MG tablet   Commonly known as:  IMITREX   Dose:  100 mg   Quantity:  18 tablet        Take 1 tablet (100 mg) by mouth at onset of headache for migraine May repeat dose in 2 hours.  Do not exceed 200 mg in 24 hours   Refills:  3        vitamin D 2000 UNITS tablet   Dose:  2000 Units   Quantity:  100 tablet        Take 2,000 Units by mouth daily   Refills:  3        * Notice:  This list has 2 medication(s) that are the same as other medications prescribed for you. Read the directions carefully, and ask your doctor or other care provider to review them with you.            Prescriptions were sent or printed at these locations (2 Prescriptions)                   Other Prescriptions     "            Printed at Department/Unit printer (2 of 2)         methylPREDNISolone (MEDROL DOSEPAK) 4 MG tablet               ciprofloxacin (CIPRO) 500 MG tablet                Procedures and tests performed during your visit     CBC with platelets differential    Comprehensive metabolic panel    Influenza A and B and RSV PCR    Peripheral IV catheter    UA reflex to Microscopic and Culture    Urine Culture Aerobic Bacterial      Orders Needing Specimen Collection     None      Pending Results     Date and Time Order Name Status Description    9/20/2017 2030 Urine Culture Aerobic Bacterial In process             Pending Culture Results     Date and Time Order Name Status Description    9/20/2017 2030 Urine Culture Aerobic Bacterial In process             Pending Results Instructions     If you had any lab results that were not finalized at the time of your Discharge, you can call the ED Lab Result RN at [phone]. You will be contacted by this team for any positive Lab results or changes in treatment. The nurses are available 7 days a week from 10A to 6:30P.  You can leave a message 24 hours per day and they will return your call.        Thank you for choosing [facility]       Thank you for choosing [facility] for your care. Our goal is always to provide you with excellent care. Hearing back from our patients is one way we can continue to improve our services. Please take a few minutes to complete the written survey that you may receive in the mail after you visit with us. Thank you!        [employer] Information     [employer] lets you send messages to your doctor, view your test results, renew your prescriptions, schedule appointments and more. To sign up, go to [email or web address] . Click on \"Log in\" on the left side of the screen, which will take you to the Welcome page. Then click on \"Sign up Now\" on the right side of the page.     You will be asked to enter the access code listed below, as well as some personal " information. Please follow the directions to create your username and password.     Your access code is: [email or web address]  Expires: 10/30/2017  9:33 AM     Your access code will  in 90 days. If you need help or a new code, please call your [facility] clinic or [phone].        Care EveryWhere ID     This is your Care EveryWhere ID. This could be used by other organizations to access your [facility] medical records  [id number]        Equal Access to Services     [facility]: Hadii aad ku hadasho Sowestleyali, waaxda luqadaha, qaybta kaalmada adejasminyakirby, jose tomlin . So [facility] [phone].    ATENCIÓN: Si habla español, tiene a hayes disposición servicios gratuitos de asistencia lingüística. Llame al [phone].    We comply with applicable federal civil rights laws and [place] laws. We do not discriminate on the basis of race, color, national origin, age, disability sex, sexual orientation or gender identity.            After Visit Summary       This is your record. Keep this with you and show to your community pharmacist(s) and doctor(s) at your next visit.

## 2017-09-21 LAB
BACTERIA SPEC CULT: ABNORMAL
Lab: ABNORMAL
SPECIMEN SOURCE: ABNORMAL

## 2017-09-21 ASSESSMENT — ENCOUNTER SYMPTOMS
EYE PAIN: 0
COLOR CHANGE: 0
SORE THROAT: 1
HEMATURIA: 0
ACTIVITY CHANGE: 1
NECK PAIN: 0
ABDOMINAL DISTENTION: 0
NECK STIFFNESS: 0
FATIGUE: 1
SINUS PRESSURE: 1
COUGH: 1
NAUSEA: 1
ARTHRALGIAS: 0
EYE REDNESS: 0
DIARRHEA: 0
CHEST TIGHTNESS: 0
SINUS PAIN: 1
ABDOMINAL PAIN: 0
DIFFICULTY URINATING: 0
CONSTIPATION: 0
DIZZINESS: 0
VOMITING: 1
WHEEZING: 0
HEADACHES: 0
FEVER: 1
TROUBLE SWALLOWING: 0
EYE DISCHARGE: 0
CHILLS: 1
RHINORRHEA: 1
CONFUSION: 0
MYALGIAS: 1
SHORTNESS OF BREATH: 0
DYSURIA: 0

## 2017-09-21 NOTE — ED PROVIDER NOTES
History     Chief Complaint   Patient presents with     Fever     Fever started early this morning.      Asthma     Has been having to use her nebulizer often today to control asthma symptoms. Patient has been exposed to influenza by a friend recently.      HPI   Phil Mckoy is a 46 year old female with a PMHx significant for chronic pain, anxiety, and intermittent asthma who complains of a fever, cough, and wheezing of 3 days duration. Patient first began to feel symptoms 4 nights ago that started with clear nasal discharge and sinus pain. The following day she developed a productive cough, yellow sputum without blood. The next day she had 4 episodes of vomiting, and has continued to vomit for two days - stating she cannot keep any food or liquids down. She denies any abdominal pain or diarrhea. She measured a fever of 102.3F with a thermometer at home last night. She has tried OTC remedies such as mucinex and nyquil that has not helped her symptoms. She denies ear pain or discharge, but is having pain in her nose when sneezing. She has had shortness of breath with wheezing, but this has improved with 8 doses of albuterol with her nebulizer, which she has been taking consistently every 4 hours. She has a positive sick contact - a friend visited her in her home and friend stated to have been seen at a medical facility for influenza.    Patient claims to have not been taking her asthma medication for 3 months (she is prescribed dulera which she was instructed to take daily). She denies any smoking, she quit 2 years ago.    I have reviewed the Medications, Allergies, Past Medical and Surgical History, and Social History in the Epic system.      Review of Systems   Constitutional: Positive for activity change, chills, fatigue and fever.   HENT: Positive for congestion, rhinorrhea, sinus pain, sinus pressure, sneezing and sore throat. Negative for ear discharge, ear pain and trouble swallowing.    Eyes:  "Negative for pain, discharge and redness.   Respiratory: Positive for cough. Negative for chest tightness, shortness of breath and wheezing.    Cardiovascular: Negative for chest pain.   Gastrointestinal: Positive for nausea and vomiting. Negative for abdominal distention, abdominal pain, constipation and diarrhea.   Genitourinary: Negative for difficulty urinating, dysuria and hematuria.   Musculoskeletal: Positive for myalgias. Negative for arthralgias, neck pain and neck stiffness.   Skin: Negative for color change and rash.   Neurological: Negative for dizziness and headaches.   Psychiatric/Behavioral: Negative for confusion.   All other systems reviewed and are negative.      Physical Exam   BP: 131/78  Heart Rate: 98  Temp: 99.1  F (37.3  C)  Resp: 16  Height: 157.5 cm (5' 2\")  Weight: 65.9 kg (145 lb 3.2 oz)  SpO2: 98 %  Physical Exam   Constitutional: She is oriented to person, place, and time. She appears well-developed and well-nourished. She appears distressed.   HENT:   Head: Normocephalic and atraumatic.   Right Ear: External ear normal.   Left Ear: External ear normal.   Mouth/Throat: Oropharynx is clear and moist. No oropharyngeal exudate.   Bilateral TM normal, nasal turbinates erythematous and edematous with clear discharge   Eyes: Conjunctivae are normal. Pupils are equal, round, and reactive to light. Right eye exhibits no discharge. Left eye exhibits no discharge. No scleral icterus.   Neck: Normal range of motion. Neck supple.   Cardiovascular: Normal rate, regular rhythm and normal heart sounds.    No murmur heard.  Pulmonary/Chest: Breath sounds normal. She is in respiratory distress (patient feels tight). She has no wheezes.   Adequate aeration throughout all lung fields.   Abdominal: Soft. Bowel sounds are normal. She exhibits no distension. There is no tenderness.   Musculoskeletal: She exhibits no edema or deformity.   Lymphadenopathy:     She has no cervical adenopathy.   Neurological: " She is alert and oriented to person, place, and time. She has normal reflexes. No cranial nerve deficit. Coordination normal.   Skin: Skin is warm and dry. No rash noted. She is not diaphoretic. No erythema. No pallor.   Psychiatric:   Flat and affect   Nursing note and vitals reviewed.      ED Course     ED Course     Jo-Ann was given an IVF bolus and started on maintenance fluids thereafter. She was given a dose of zofran, which helped with her nausea. Labs were ordered, which showed signs of a urinary tract infection, so she was started on ciprofloxacin 500mg po daily. Patient also had chest tightness, so she was given a duoneb, which helped to improve her symptoms. She was also given a 40mg dose of prednisone for continued chest tightness.     Procedures      No procedures done.       Labs Ordered and Resulted from Time of ED Arrival Up to the Time of Departure from the ED   COMPREHENSIVE METABOLIC PANEL - Abnormal; Notable for the following:        Result Value    Sodium 147 (*)     Chloride 112 (*)     All other components within normal limits   UA MACROSCOPIC WITH REFLEX TO MICRO AND CULTURE - Abnormal; Notable for the following:     Protein Albumin Urine 10 (*)     Urobilinogen mg/dL 4.0 (*)     Leukocyte Esterase Urine Large (*)     RBC Urine 4 (*)     WBC Urine 80 (*)     Bacteria Urine Moderate (*)     Squamous Epithelial /HPF Urine 7 (*)     Mucous Urine Present (*)     Amorphous Crystals Few (*)     All other components within normal limits   CBC WITH PLATELETS DIFFERENTIAL   PERIPHERAL IV CATHETER   INFLUENZA A AND B AND RSV PCR     Results for orders placed or performed during the hospital encounter of 09/20/17   CBC with platelets differential   Result Value Ref Range    WBC 7.8 4.0 - 11.0 10e9/L    RBC Count 4.59 3.8 - 5.2 10e12/L    Hemoglobin 13.5 11.7 - 15.7 g/dL    Hematocrit 40.2 35.0 - 47.0 %    MCV 88 78 - 100 fl    MCH 29.4 26.5 - 33.0 pg    MCHC 33.6 31.5 - 36.5 g/dL    RDW 13.0 10.0 - 15.0  %    Platelet Count 217 150 - 450 10e9/L    Diff Method Automated Method     % Neutrophils 50.7 %    % Lymphocytes 35.7 %    % Monocytes 12.0 %    % Eosinophils 1.3 %    % Basophils 0.3 %    % Immature Granulocytes 0.0 %    Nucleated RBCs 0 0 /100    Absolute Neutrophil 4.0 1.6 - 8.3 10e9/L    Absolute Lymphocytes 2.8 0.8 - 5.3 10e9/L    Absolute Monocytes 0.9 0.0 - 1.3 10e9/L    Absolute Eosinophils 0.1 0.0 - 0.7 10e9/L    Absolute Basophils 0.0 0.0 - 0.2 10e9/L    Abs Immature Granulocytes 0.0 0 - 0.4 10e9/L    Absolute Nucleated RBC 0.0    Comprehensive metabolic panel   Result Value Ref Range    Sodium 147 (H) 133 - 144 mmol/L    Potassium 3.8 3.4 - 5.3 mmol/L    Chloride 112 (H) 94 - 109 mmol/L    Carbon Dioxide 26 20 - 32 mmol/L    Anion Gap 9 3 - 14 mmol/L    Glucose 96 70 - 99 mg/dL    Urea Nitrogen 11 7 - 30 mg/dL    Creatinine 0.90 0.52 - 1.04 mg/dL    GFR Estimate 68 >60 mL/min/1.7m2    GFR Estimate If Black 82 >60 mL/min/1.7m2    Calcium 8.7 8.5 - 10.1 mg/dL    Bilirubin Total 0.3 0.2 - 1.3 mg/dL    Albumin 3.8 3.4 - 5.0 g/dL    Protein Total 7.6 6.8 - 8.8 g/dL    Alkaline Phosphatase 146 40 - 150 U/L    ALT 32 0 - 50 U/L    AST 21 0 - 45 U/L   UA reflex to Microscopic and Culture   Result Value Ref Range    Color Urine Yellow     Appearance Urine Cloudy     Glucose Urine Negative NEG^Negative mg/dL    Bilirubin Urine Negative NEG^Negative    Ketones Urine Negative NEG^Negative mg/dL    Specific Gravity Urine 1.019 1.003 - 1.035    Blood Urine Negative NEG^Negative    pH Urine 7.0 5.0 - 7.0 pH    Protein Albumin Urine 10 (A) NEG^Negative mg/dL    Urobilinogen mg/dL 4.0 (H) 0.0 - 2.0 mg/dL    Nitrite Urine Negative NEG^Negative    Leukocyte Esterase Urine Large (A) NEG^Negative    Source Midstream Urine     RBC Urine 4 (H) 0 - 2 /HPF    WBC Urine 80 (H) 0 - 2 /HPF    Bacteria Urine Moderate (A) NEG^Negative /HPF    Squamous Epithelial /HPF Urine 7 (H) 0 - 1 /HPF    Mucous Urine Present (A) NEG^Negative  /LPF    Amorphous Crystals Few (A) NEG^Negative /HPF   Influenza A and B and RSV PCR   Result Value Ref Range    Specimen Description Nasopharyngeal     Influenza A PCR Negative NEG^Negative    Influenza B PCR Negative NEG^Negative    Resp Syncytial Virus Negative NEG^Negative   Urine Culture Aerobic Bacterial   Result Value Ref Range    Specimen Description Unspecified Urine     Special Requests Specimen received in preservative     Culture Micro PENDING      *Note: Due to a large number of results and/or encounters for the requested time period, some results have not been displayed. A complete set of results can be found in Results Review.            Assessments & Plan (with Medical Decision Making)      Phil is a 47yo F, with a PMHx significant for chronic pain, intermittent asthma, and anxiety, who presented with cough and fever for 3 days, and 2 days of nausea and vomiting. She also experienced some shortness of breath and wheezing at home, which resolved with home albuterol nebulizer treatments. While in the ED, her vitals were within normal limits and her lungs were clear to auscultation bilaterally. Her signs and symptoms are most consistent with a viral upper respiratory infection, although her Her nasopharyngeal swab was negative influenza A/B and RSV. This possible viral URI, is most likely causing an asthma exacerbation. This is supported by her positive sick contacts. I felt the patient did not need a chest xray, as her vitals were within normal limits, and did not raise suspicion for pneumonia. Her chest tightness improved with duoneb treatment, so she was given a dose of oral steroids prior discharge, and given a prescription for a medrol pack to start tomorrow. Her UA did show an infection, so she was started on ciprofloxacin 500mg po daily - this antibiotic was selected, and will continue for a total of 10 days, to cover her UTI and possible sinus infection, which is on the differential given  her fever and maxillary sinus tenderness.    I have reviewed the nursing notes.    I have reviewed the findings, diagnosis, plan and need for follow up with the patient.  Liliana Bobo DO  PGY1 Family Medicine Resident  Pager: [phone]    This data collected with the Resident working in the Emergency Department.  Patient was seen and evaluated by myself and I repeated the history and physical exam with the patient.  The plan of care was discussed with them.  The key portions of the note including the entire assessment and plan reflect my documentation.      Joseph Cm MD.      Discharge Medication List as of 9/20/2017 11:45 PM      START taking these medications    Details   methylPREDNISolone (MEDROL DOSEPAK) 4 MG tablet Follow package instructions, Disp-21 tablet, R-0, Local Print      ciprofloxacin (CIPRO) 500 MG tablet Take 1 tablet (500 mg) by mouth daily for 9 days, Disp-9 tablet, R-0, Local Print             Final diagnoses:   Asthma exacerbation   Urinary tract infection without hematuria, site unspecified   Acute non-recurrent maxillary sinusitis       9/20/2017   [facility], EMERGENCY DEPARTMENT      This note was created at least in part by the use of dragon voice dictation system. Inadvertent typographical errors may still exist.  Joseph Cm MD.         Joseph Cm MD  09/21/17 0242

## 2017-09-21 NOTE — DISCHARGE INSTRUCTIONS
Please make an appointment to follow up with Your Primary Care Provider in 7 days if not improving.    1. Take medrol dose tricia as prescribed.  2. Use albuterol nebulizer at home as needed for wheezing.  3. Take new antibiotic, ciprofloxacin once daily for 9 days.

## 2017-09-22 ENCOUNTER — TELEPHONE (OUTPATIENT)
Dept: FAMILY MEDICINE | Facility: CLINIC | Age: 47
End: 2017-09-22

## 2017-09-22 ENCOUNTER — TELEPHONE (OUTPATIENT)
Dept: EMERGENCY MEDICINE | Facility: CLINIC | Age: 47
End: 2017-09-22

## 2017-09-22 ENCOUNTER — OFFICE VISIT (OUTPATIENT)
Dept: FAMILY MEDICINE | Facility: CLINIC | Age: 47
End: 2017-09-22
Payer: COMMERCIAL

## 2017-09-22 DIAGNOSIS — N39.0 URINARY TRACT INFECTION: ICD-10-CM

## 2017-09-22 DIAGNOSIS — R31.9 URINARY TRACT INFECTION WITH HEMATURIA, SITE UNSPECIFIED: ICD-10-CM

## 2017-09-22 DIAGNOSIS — N39.0 E. COLI UTI (URINARY TRACT INFECTION): Primary | ICD-10-CM

## 2017-09-22 DIAGNOSIS — N39.0 URINARY TRACT INFECTION WITH HEMATURIA, SITE UNSPECIFIED: ICD-10-CM

## 2017-09-22 DIAGNOSIS — B96.20 E. COLI UTI (URINARY TRACT INFECTION): Primary | ICD-10-CM

## 2017-09-22 PROCEDURE — 99207 ZZC NO CHARGE LOS: CPT | Performed by: NURSE PRACTITIONER

## 2017-09-22 RX ORDER — CEPHALEXIN 500 MG/1
500 CAPSULE ORAL 4 TIMES DAILY
Qty: 40 CAPSULE | Refills: 0 | Status: SHIPPED | OUTPATIENT
Start: 2017-09-22 | End: 2017-10-02

## 2017-09-22 RX ORDER — NITROFURANTOIN 25; 75 MG/1; MG/1
100 CAPSULE ORAL 2 TIMES DAILY
Qty: 14 CAPSULE | Refills: 0 | Status: CANCELLED
Start: 2017-09-22

## 2017-09-22 NOTE — TELEPHONE ENCOUNTER
Pt did not show up for her appt today. She was seen in ED 2 days ago. Please call pt and let her know we received her culture results from the ED, she is resistant to the antibiotic they gave her (Cipro). It showed E. Coli, she has several allergies. We also need updated pharmacy info so I can send in her script. Please send it once you obtain pharm info.

## 2017-09-22 NOTE — TELEPHONE ENCOUNTER
[place] [place]  Emergency Department Lab result notification [Adult-Female]    [facility] ED lab result protocol used  Urine Culture    Reason for call  Notify of lab results, assess symptoms,  review ED providers recommendations/discharge instructions (if necessary) and advise per ED lab result f/u protocol    Lab Result (including Rx patient on, if applicable)  Final Urine Culture Report on 9/22/17  [place] ED discharge antibiotic: Ciprofloxacin (Cipro) 500 mg tablet, 1 tablet (500 mg) by mouth daily for 9 days  #1. Bacteria, >100,000 colonies/mL Escherichia coli,  is [RESISTANT] to ED discharge antibiotic.   Change in treatment as per [place] ED Lab result protocol.    Information table from ED Provider visit on 9/20/17  ED diagnosis Urinary tract infection without hematuria   ED provider Joseph Cm MD   Symptoms reported at ED visit (Chief complaint, HPI) Phil Mckoy is a 46 year old female with a PMHx significant for chronic pain, anxiety, and intermittent asthma who complains of a fever, cough, and wheezing of 3 days duration. Patient first began to feel symptoms 4 nights ago that started with clear nasal discharge and sinus pain. The following day she developed a productive cough, yellow sputum without blood. The next day she had 4 episodes of vomiting, and has continued to vomit for two days - stating she cannot keep any food or liquids down. She denies any abdominal pain or diarrhea. She measured a fever of 102.3F with a thermometer at home last night. She has tried OTC remedies such as mucinex and nyquil that has not helped her symptoms. She denies ear pain or discharge, but is having pain in her nose when sneezing. She has had shortness of breath with wheezing, but this has improved with 8 doses of albuterol with her nebulizer, which she has been taking consistently every 4 hours. She has a positive sick contact - a friend visited her in her home and friend stated to have been seen at  a medical facility for influenza.                         Patient claims to have not been taking her asthma medication for 3 months (she is prescribed dulera which she was instructed to take daily). She denies any smoking, she quit 2 years ago.   ED providers Impression and Plan (applicable information)           Phil is a 47yo F, with a PMHx significant for chronic pain, intermittent asthma, and anxiety, who presented with cough and fever for 3 days, and 2 days of nausea and vomiting. She also experienced some shortness of breath and wheezing at home, which resolved with home albuterol nebulizer treatments. While in the ED, her vitals were within normal limits and her lungs were clear to auscultation bilaterally. Her signs and symptoms are most consistent with a viral upper respiratory infection, although her Her nasopharyngeal swab was negative influenza A/B and RSV. This possible viral URI, is most likely causing an asthma exacerbation. This is supported by her positive sick contacts. I felt the patient did not need a chest xray, as her vitals were within normal limits, and did not raise suspicion for pneumonia. Her chest tightness improved with duoneb treatment, so she was given a dose of oral steroids prior discharge, and given a prescription for a medrol pack to start tomorrow. Her UA did show an infection, so she was started on ciprofloxacin 500mg po daily - this antibiotic was selected, and will continue for a total of 10 days, to cover her UTI and possible sinus infection, which is on the differential given her fever and maxillary sinus tenderness.   Significant Medical hx, if applicable UTI,TMJ,migraines, insomnia, asthma, low back pain, obesity, depression   Coumadin/Warfarin [Yes /No] No   Creatinine Level (mg/dl) 0.90   Creatinine clearance (ml/min), if applicable 82.3   Pregnant (Yes/No/NA) No   Breastfeeding (Yes/No/NA) No   Allergies Compazine, Erythromycin, sulfa drugs, PCN's, Gabapentin  "  Weight, if applicable 65.9 Kg      RN Assessment (Patient s current Symptoms), include time called.  [Insert Left message here if message left]  Hood \"better\" on Cipro.  Did review final results and recommendations.  PCP OV was not attended today, PCP had attempted to reach patient to change treatment, with no return phone call.  Since clinic closed for the weekend, did attempt and get ahold of patient.  Phil reports PCN allergy is hives, denies anaphylaxis.  Also noted she has been on and tolerated Keflex historically.  Phil verbalizes understanding.    RN Recommendations/Instructions per [place] ED lab result protocol  Patient notified of lab result and treatment recommendations.  Rx for Keflex sent to [Pharmacy - [facility]].  RN reviewed information about stopping Cipro once Keflex  Obtained.    Please Contact your PCP clinic or return to the Emergency department if your:    Symptoms return.    Symptoms do not resolve after completing antibiotic.    Symptoms worsen or other concerning symptom's.    PCP follow-up Questions asked: NO    Harleen Estrada RN    [facility] [place] [facility] RN  Lung Nodule and ED Lab Results F/U RN  Epic pool (ED late result f/u RN) : [id number]   # [phone]    Copy of Lab result   Order   Urine Culture Aerobic Bacterial [[id number]] (Order [id number])   Exam Information   Exam Date Exam Time Accession # Results    9/20/17  8:30 PM [id number]    Component Results   Component Collected Lab   Specimen Description 09/20/2017  8:30 PM 75   Unspecified Urine   Special Requests 09/20/2017  8:30 PM 75   Specimen received in preservative   Culture Micro (Abnormal) 09/20/2017  8:30 PM 75   >100,000 colonies/mL   Escherichia coli      Culture & Susceptibility   ESCHERICHIA COLI   Antibiotic Interpretation Sensitivity Unit Method Status   AMPICILLIN Sensitive 8 ug/mL PETER Final   AMPICILLIN/SULBACTAM Sensitive 4 ug/mL PETER Final   CEFAZOLIN Sensitive <=4 ug/mL PETER Final   Comment: " Cefazolin PETER breakpoints are for the treatment of uncomplicated urinary tract   infections.  For the treatment of systemic infections, please contact the   laboratory for additional testing.   CEFEPIME Sensitive <=1 ug/mL PETER Final   CEFOXITIN Sensitive 8 ug/mL PETER Final   CEFTAZIDIME Sensitive <=1 ug/mL PETER Final   CEFTRIAXONE Sensitive <=1 ug/mL PETER Final   CIPROFLOXACIN Resistant >=4 ug/mL PETER Final   GENTAMICIN Sensitive <=1 ug/mL PETER Final   LEVOFLOXACIN Resistant >=8 ug/mL PETER Final   NITROFURANTOIN Sensitive <=16 ug/mL PETER Final   Piperacillin/Tazo Sensitive <=4 ug/mL PETER Final   TOBRAMYCIN Sensitive <=1 ug/mL PETER Final   Trimethoprim/Sulfa Sensitive <=1/19 ug/mL PETER Final

## 2017-09-22 NOTE — PROGRESS NOTES
Pt No Showed appt today. Received results of culture, pt is resistant to cipro, pt has several allergies to antibiotics. Sensistive to macrobid. Pt needs to be notified of results, need current pharmacy info and will send new script.

## 2017-09-22 NOTE — TELEPHONE ENCOUNTER
Return call to patient left message requesting call back today in clinic - discussed if she gets this call after hours please return call to clinic to speak with [facility]NA regarding follow up care from recent visit    Tenzin Brannon RN

## 2017-09-22 NOTE — MR AVS SNAPSHOT
"              After Visit Summary   2017    Phil Mckoy    MRN: [id number]           Patient Information     Date Of Birth          1970        Visit Information        Provider Department      2017 11:00 AM Carol Lantigua APRN CNP [facility]        Today's Diagnoses     E. coli UTI (urinary tract infection)    -  1       Follow-ups after your visit        Who to contact     If you have questions or need follow up information about today's clinic visit or your schedule please contact [facility] directly at [phone].  Normal or non-critical lab and imaging results will be communicated to you by EasilyDohart, letter or phone within 4 business days after the clinic has received the results. If you do not hear from us within 7 days, please contact the clinic through EasilyDohart or phone. If you have a critical or abnormal lab result, we will notify you by phone as soon as possible.  Submit refill requests through Project Dance or call your pharmacy and they will forward the refill request to us. Please allow 3 business days for your refill to be completed.          Additional Information About Your Visit        MyChart Information     Project Dance lets you send messages to your doctor, view your test results, renew your prescriptions, schedule appointments and more. To sign up, go to [email or web address] . Click on \"Log in\" on the left side of the screen, which will take you to the Welcome page. Then click on \"Sign up Now\" on the right side of the page.     You will be asked to enter the access code listed below, as well as some personal information. Please follow the directions to create your username and password.     Your access code is: [email or web address]  Expires: 2018  3:30 AM     Your access code will  in 90 days. If you need help or a new code, please call your [facility] or [phone].        Care EveryWhere ID     This " is your Care EveryWhere ID. This could be used by other organizations to access your [place] medical records  [id number]         Blood Pressure from Last 3 Encounters:   05/02/18 (!) 128/99   09/20/17 108/67   09/18/17 130/74    Weight from Last 3 Encounters:   09/20/17 145 lb 3.2 oz (65.9 kg)   09/18/17 143 lb (64.9 kg)   08/28/17 148 lb 6.4 oz (67.3 kg)              Today, you had the following     No orders found for display         Today's Medication Changes          These changes are accurate as of 9/22/17 11:59 PM.  If you have any questions, ask your nurse or doctor.               Start taking these medicines.        Dose/Directions    cephALEXin 500 MG capsule   Commonly known as:  KEFLEX   Used for:  Urinary tract infection   Started by:  Eleanor Estrada RN        Dose:  500 mg   Take 1 capsule (500 mg) by mouth 4 times daily for 10 days   Quantity:  40 capsule   Refills:  0         Stop taking these medicines if you haven't already. Please contact your care team if you have questions.     ciprofloxacin 500 MG tablet   Commonly known as:  CIPRO   Stopped by:  Eleanor Estrada RN                Where to get your medicines      These medications were sent to [facility] [place], [place] - [address]  [address] [address]     Phone:  [phone]     cephALEXin 500 MG capsule                Primary Care Provider Office Phone # Fax #    [facility] [phone]       [address] [facility] [address]        Equal Access to Services     [facility]: Hadii aad ku hadasho Soomaali, waaxda luqadaha, qaybta kaalmada adeegyada, jose salazar. So [facility] [phone].    ATENCIÓN: Si habla español, tiene a hayes disposición servicios gratuitos de asistencia lingüística. Llame al [phone].    We comply with applicable federal civil rights laws and [place] laws. We do not discriminate  on the basis of race, color, national origin, age, disability, sex, sexual orientation, or gender identity.            Thank you!     Thank you for choosing [facility]  for your care. Our goal is always to provide you with excellent care. Hearing back from our patients is one way we can continue to improve our services. Please take a few minutes to complete the written survey that you may receive in the mail after your visit with us. Thank you!             Your Updated Medication List - Protect others around you: Learn how to safely use, store and throw away your medicines at www.disposemymeds.org.          This list is accurate as of 9/22/17 11:59 PM.  Always use your most recent med list.                   Brand Name Dispense Instructions for use Diagnosis    albuterol 108 (90 Base) MCG/ACT Inhaler    PROAIR HFA/PROVENTIL HFA/VENTOLIN HFA    1 Inhaler    Inhale 2 puffs into the lungs every 6 hours as needed for shortness of breath / dyspnea    Intermittent asthma, uncomplicated       cephALEXin 500 MG capsule    KEFLEX    40 capsule    Take 1 capsule (500 mg) by mouth 4 times daily for 10 days    Urinary tract infection       LORazepam 1 MG tablet    ATIVAN    1 tablet    Take 1 tablet (1 mg) by mouth as needed 30 minutes prior appointment    Situational anxiety       multivitamin, therapeutic with minerals Tabs tablet     100 tablet    Take 1 tablet by mouth daily    Osteoarthritis of spine with radiculopathy, lumbar region       order for DME     1 Device    Equipment being ordered: Back brace    Chronic midline low back pain without sciatica       SUMAtriptan 100 MG tablet    IMITREX    18 tablet    Take 1 tablet (100 mg) by mouth at onset of headache for migraine May repeat dose in 2 hours.  Do not exceed 200 mg in 24 hours    Migraine without status migrainosus, not intractable, unspecified migraine type

## 2017-09-26 ENCOUNTER — TRANSFERRED RECORDS (OUTPATIENT)
Dept: HEALTH INFORMATION MANAGEMENT | Facility: CLINIC | Age: 47
End: 2017-09-26

## 2017-09-26 NOTE — TELEPHONE ENCOUNTER
Chart reviewed.  Ed called pt and notified of resistance and placed her on a different Abx.  Keflex.      See ED note dated 9/22/17.    Will close encounter at this time and follow up as needed.     Will forward to PCP as ELSIE Flores RN

## 2017-10-26 ENCOUNTER — TRANSFERRED RECORDS (OUTPATIENT)
Dept: HEALTH INFORMATION MANAGEMENT | Facility: CLINIC | Age: 47
End: 2017-10-26

## 2017-10-26 LAB — PHQ9 SCORE: 0

## 2018-05-02 ENCOUNTER — HOSPITAL ENCOUNTER (EMERGENCY)
Facility: CLINIC | Age: 48
Discharge: HOME | End: 2018-05-02
Attending: EMERGENCY MEDICINE | Admitting: EMERGENCY MEDICINE
Payer: COMMERCIAL

## 2018-05-02 VITALS
HEART RATE: 73 BPM | DIASTOLIC BLOOD PRESSURE: 99 MMHG | SYSTOLIC BLOOD PRESSURE: 128 MMHG | RESPIRATION RATE: 16 BRPM | TEMPERATURE: 97.9 F | OXYGEN SATURATION: 99 %

## 2018-05-02 DIAGNOSIS — M54.41 BILATERAL LOW BACK PAIN WITH RIGHT-SIDED SCIATICA, UNSPECIFIED CHRONICITY: ICD-10-CM

## 2018-05-02 PROCEDURE — 96372 THER/PROPH/DIAG INJ SC/IM: CPT | Performed by: EMERGENCY MEDICINE

## 2018-05-02 PROCEDURE — 25000128 H RX IP 250 OP 636: Performed by: EMERGENCY MEDICINE

## 2018-05-02 PROCEDURE — 99284 EMERGENCY DEPT VISIT MOD MDM: CPT | Mod: Z6 | Performed by: EMERGENCY MEDICINE

## 2018-05-02 PROCEDURE — 99284 EMERGENCY DEPT VISIT MOD MDM: CPT | Mod: 25 | Performed by: EMERGENCY MEDICINE

## 2018-05-02 RX ORDER — METHYLPREDNISOLONE 4 MG
TABLET, DOSE PACK ORAL
Qty: 21 TABLET | Refills: 0 | Status: SHIPPED | OUTPATIENT
Start: 2018-05-02

## 2018-05-02 RX ORDER — NAPROXEN 500 MG/1
500 TABLET ORAL 2 TIMES DAILY PRN
Qty: 20 TABLET | Refills: 0 | Status: SHIPPED | OUTPATIENT
Start: 2018-05-02

## 2018-05-02 RX ORDER — KETOROLAC TROMETHAMINE 30 MG/ML
30 INJECTION, SOLUTION INTRAMUSCULAR; INTRAVENOUS ONCE
Status: COMPLETED | OUTPATIENT
Start: 2018-05-02 | End: 2018-05-02

## 2018-05-02 RX ADMIN — KETOROLAC TROMETHAMINE 30 MG: 30 INJECTION, SOLUTION INTRAMUSCULAR at 03:21

## 2018-05-02 NOTE — ED PROVIDER NOTES
History     Chief Complaint   Patient presents with     Back Pain     Having back pain for 2 days. Patient usually is seen at [facility].  Lower right to right hip.     HPI  Phil Mckoy is a 47 year old female who presents with exacerbation of chronic recurrent low back pain. Radiates to the right buttock. No bowel or bladder dysfunction. No loss of strength or sensation. No saddle area anesthesia. Pain aggravated by movement. She had and MRI in 12/2017 showing mild L5-S1 disc herniation. No recent trauma. No fever or chills or other systemic symptoms. No IV drug use. No history of malignancy or inflammatory disease. No  symptoms. No retention or incontinence. No history of kidney stones. No abdominal pain, no chest pain, no inguinal pain.     I have reviewed the Medications, Allergies, Past Medical and Surgical History, and Social History in the creads system.  Past Medical History:   Diagnosis Date     Abdominal pain      Asthma      Chronic pain     low back     Gallbladder polyp      Gastro-oesophageal reflux disease      Hyperlipidemia      Hypertension      Insomnia      LGSIL (low grade squamous intraepithelial lesion) on Pap smear     9-5-95 lgsil, 9-22-97 ascus     Migraines     4x per week, out of elavil     PTSD (post-traumatic stress disorder)      TMJ syndrome        Review of Systems   Constitutional: Negative.  Negative for activity change, appetite change, chills, diaphoresis, fatigue, fever and unexpected weight change.   HENT: Negative for congestion, rhinorrhea and sore throat.    Respiratory: Negative for cough, chest tightness and shortness of breath.    Cardiovascular: Negative for chest pain, palpitations and leg swelling.   Gastrointestinal: Negative for abdominal pain, constipation, diarrhea, nausea and vomiting.   Genitourinary: Negative for difficulty urinating, dysuria, flank pain, frequency, hematuria, pelvic pain, vaginal bleeding and vaginal discharge.   Musculoskeletal:  Positive for back pain. Negative for arthralgias, gait problem, joint swelling, myalgias and neck pain.   Skin: Negative for rash.   Allergic/Immunologic: Negative for immunocompromised state.   Neurological: Negative for dizziness, syncope, weakness, light-headedness, numbness and headaches.   Hematological: Does not bruise/bleed easily.   Psychiatric/Behavioral: Negative for confusion.       Physical Exam   BP: (!) 128/99  Pulse: 73  Temp: 97.9  F (36.6  C)  Resp: 16  SpO2: 99 %      Physical Exam   Constitutional: She appears well-developed and well-nourished. No distress.   HENT:   Head: Atraumatic.   Mouth/Throat: Oropharynx is clear and moist.   Eyes: Conjunctivae and EOM are normal. Pupils are equal, round, and reactive to light.   Neck: Normal range of motion. Neck supple.   Cardiovascular: Normal rate, regular rhythm, normal heart sounds and intact distal pulses.    Pulmonary/Chest: Effort normal and breath sounds normal. No respiratory distress.   Abdominal: Soft. Bowel sounds are normal. There is no tenderness.   Musculoskeletal: She exhibits tenderness. She exhibits no edema or deformity.        Right hip: Normal.        Left hip: Normal.        Cervical back: Normal.        Thoracic back: Normal.        Lumbar back: She exhibits tenderness and pain. She exhibits normal range of motion, no swelling, no deformity, no spasm and normal pulse.   Nursing note and vitals reviewed.      ED Course     ED Course     Procedures             Critical Care time:  none             Labs Ordered and Resulted from Time of ED Arrival Up to the Time of Departure from the ED - No data to display         Assessments & Plan (with Medical Decision Making)   Given dose of IM ketorolac. Will discharge with naproxen and Medrol dose pack. Clinic follow up within one week. Return if persistent, new, or worsening symptoms. She has known lumbar disc disease. No evidence of infection, urolithiasis, cauda equina syndrome, aneurysm,  arthritis, pancreatitis, urinary tract infection, etc.    I have reviewed the nursing notes.    I have reviewed the findings, diagnosis, plan and need for follow up with the patient.    Discharge Medication List as of 5/2/2018  3:30 AM      START taking these medications    Details   methylPREDNISolone (MEDROL DOSEPAK) 4 MG tablet Follow package instructions, Disp-21 tablet, R-0, Local Print      naproxen (NAPROSYN) 500 MG tablet Take 1 tablet (500 mg) by mouth 2 times daily as needed for moderate pain, Disp-20 tablet, R-0, Local Print             Final diagnoses:   Bilateral low back pain with right-sided sciatica, unspecified chronicity       5/2/2018   [facility] [place], EMERGENCY DEPARTMENT     Catrachito Coombs MD  06/09/18 0054

## 2018-05-02 NOTE — ED AVS SNAPSHOT
[facility], Emergency Department    [address]    [place] [address]    Phone:  [phone]    Fax:  [phone]                                       Phil Mckoy   MRN: [id number]    Department:  [facility], Emergency Department   Date of Visit:  5/2/2018           Patient Information     Date Of Birth          1970        Your diagnoses for this visit were:     Bilateral low back pain with right-sided sciatica, unspecified chronicity        You were seen by Catrachito Coombs MD.      Follow-up Information     Follow up with Kaiden Quintana.    Contact information:    [address] [place] [address] [phone]          Discharge Instructions       Take Medrol and naproxen as directed.  Follow up in clinic within one week.  Return if persistent symptoms.    24 Hour Appointment Hotline       To make an appointment at any [facility] clinic, call [phone] ([phone]). If you don't have a family doctor or clinic, we will help you find one. [facility] clinics are conveniently located to serve the needs of you and your family.             Review of your medicines      START taking        Dose / Directions Last dose taken    methylPREDNISolone 4 MG tablet   Commonly known as:  MEDROL DOSEPAK   Quantity:  21 tablet        Follow package instructions   Refills:  0        naproxen 500 MG tablet   Commonly known as:  NAPROSYN   Dose:  500 mg   Quantity:  20 tablet        Take 1 tablet (500 mg) by mouth 2 times daily as needed for moderate pain   Refills:  0          Our records show that you are taking the medicines listed below. If these are incorrect, please call your family doctor or clinic.        Dose / Directions Last dose taken    albuterol 108 (90 Base) MCG/ACT Inhaler   Commonly known as:  PROAIR HFA/PROVENTIL HFA/VENTOLIN HFA   Dose:  2 puff   Quantity:  1 Inhaler        Inhale 2 puffs into the lungs every 6 hours as needed for shortness of  breath / dyspnea   Refills:  1        LORazepam 1 MG tablet   Commonly known as:  ATIVAN   Dose:  1 mg   Quantity:  1 tablet        Take 1 tablet (1 mg) by mouth as needed 30 minutes prior appointment   Refills:  0        multivitamin, therapeutic with minerals Tabs tablet   Dose:  1 tablet   Quantity:  100 tablet        Take 1 tablet by mouth daily   Refills:  3        order for DME   Quantity:  1 Device        Equipment being ordered: Back brace   Refills:  0        SUMAtriptan 100 MG tablet   Commonly known as:  IMITREX   Dose:  100 mg   Quantity:  18 tablet        Take 1 tablet (100 mg) by mouth at onset of headache for migraine May repeat dose in 2 hours.  Do not exceed 200 mg in 24 hours   Refills:  3                Prescriptions were sent or printed at these locations (2 Prescriptions)                   Other Prescriptions                Printed at Department/Unit printer (2 of 2)         methylPREDNISolone (MEDROL DOSEPAK) 4 MG tablet               naproxen (NAPROSYN) 500 MG tablet                Orders Needing Specimen Collection     None      Pending Results     No orders found from 4/30/2018 to 5/3/2018.            Pending Culture Results     No orders found from 4/30/2018 to 5/3/2018.            Pending Results Instructions     If you had any lab results that were not finalized at the time of your Discharge, you can call the ED Lab Result RN at [phone]. You will be contacted by this team for any positive Lab results or changes in treatment. The nurses are available 7 days a week from 10A to 6:30P.  You can leave a message 24 hours per day and they will return your call.        Thank you for choosing [facility]       Thank you for choosing [facility] for your care. Our goal is always to provide you with excellent care. Hearing back from our patients is one way we can continue to improve our services. Please take a few minutes to complete the written survey that you may receive in the mail after you visit  "with us. Thank you!        [employer] Information     P[employer] Rockout Workout lets you send messages to your doctor, view your test results, renew your prescriptions, schedule appointments and more. To sign up, go to [email or web address] . Click on \"Log in\" on the left side of the screen, which will take you to the Welcome page. Then click on \"Sign up Now\" on the right side of the page.     You will be asked to enter the access code listed below, as well as some personal information. Please follow the directions to create your username and password.     Your access code is: [email or web address]  Expires: 2018  3:30 AM     Your access code will  in 90 days. If you need help or a new code, please call your [facility] clinic or [phone].        Care EveryWhere ID     This is your Care EveryWhere ID. This could be used by other organizations to access your [facility] medical records  [id number]        Equal Access to Services     [facility] : Hadii aad ku hadasho Sowestleyali, waaxda luqadaha, qaybta kaalmada adeegyakirby, jose tomlin . So [facility] [phone].    ATENCIÓN: Si habla español, tiene a hayes disposición servicios gratuitos de asistencia lingüística. Llame al [phone].    We comply with applicable federal civil rights laws and [place] laws. We do not discriminate on the basis of race, color, national origin, age, disability, sex, sexual orientation, or gender identity.            After Visit Summary       This is your record. Keep this with you and show to your community pharmacist(s) and doctor(s) at your next visit.                  "

## 2018-05-02 NOTE — DISCHARGE INSTRUCTIONS
Take Medrol and naproxen as directed.  Follow up in clinic within one week.  Return if persistent symptoms.

## 2018-05-02 NOTE — ED AVS SNAPSHOT
[facility], Emergency Department    [address]    [facility] [place] [address]    Phone:  [phone]    Fax:  [phone]                                       Phil Mckoy   MRN: [id number]    Department:  [facility], Emergency Department   Date of Visit:  5/2/2018           After Visit Summary Signature Page     I have received my discharge instructions, and my questions have been answered. I have discussed any challenges I see with this plan with the nurse or doctor.    ..........................................................................................................................................  Patient/Patient Representative Signature      ..........................................................................................................................................  Patient Representative Print Name and Relationship to Patient    ..................................................               ................................................  Date                                            Time    ..........................................................................................................................................  Reviewed by Signature/Title    ...................................................              ..............................................  Date                                                            Time

## 2018-06-08 ENCOUNTER — TELEPHONE (OUTPATIENT)
Dept: FAMILY MEDICINE | Facility: CLINIC | Age: 48
End: 2018-06-08

## 2018-06-08 NOTE — TELEPHONE ENCOUNTER
This patient would like to come back to be seen here at [facility]. She used to see Dr. Cha and was scheduled to Re-Est. Care with Carol but she No-Show and Cancel a few times back in September 2017. She has been seen at [facility] in [place].      Chart was reviewed, she is currently being managed by Dr. Shaan Canseco through [facility], she has been seeing him regularly, since 10/2017,  he is managing her chronic pain with specialists and MH issues with Psych support.  At this time she does not qualify to return to the [facility] clinic and should stay with Dr. Canseco for better continuity of care.  Please let her know     Patti Perry [facility]  MEDICAL LEAD

## 2018-06-09 ASSESSMENT — ENCOUNTER SYMPTOMS
NECK PAIN: 0
NAUSEA: 0
ABDOMINAL PAIN: 0
RHINORRHEA: 0
DYSURIA: 0
FREQUENCY: 0
LIGHT-HEADEDNESS: 0
PALPITATIONS: 0
CONFUSION: 0
JOINT SWELLING: 0
HEADACHES: 0
DIFFICULTY URINATING: 0
DIZZINESS: 0
CONSTIPATION: 0
FEVER: 0
ACTIVITY CHANGE: 0
SORE THROAT: 0
UNEXPECTED WEIGHT CHANGE: 0
FATIGUE: 0
DIAPHORESIS: 0
SHORTNESS OF BREATH: 0
NUMBNESS: 0
COUGH: 0
WEAKNESS: 0
BRUISES/BLEEDS EASILY: 0
CHEST TIGHTNESS: 0
MYALGIAS: 0
ARTHRALGIAS: 0
CHILLS: 0
APPETITE CHANGE: 0
CONSTITUTIONAL NEGATIVE: 1
DIARRHEA: 0
VOMITING: 0
BACK PAIN: 1
FLANK PAIN: 0
HEMATURIA: 0

## 2018-06-11 NOTE — TELEPHONE ENCOUNTER
Pt called about scheduling an appt, writer relay message below from Patti. Pt was in agreement and she will continue to see Dr. Shaan Canseco.   Writer closing encounter no, further action needed.      Anirudh Syed

## 2021-07-01 NOTE — TELEPHONE ENCOUNTER
Have patient schedule an appointment for a visit.   Unclear why she needs Zofran.     Fozia Villar    
Pt is on a new medication from her psychiatrist that is making her feel very sick.      Writer asked what medication and Pt was unable to tell writer what medication she was started on, pt stated that her dogs got a hold of her medication and she can't read the label any longer.    Writer discussed that side effects usually go away with treatment.  Pt has Appt with Dr. Hyman on the 23rd.  Pt stated that she can wait to discuss with Dr. Hyman on the 23rd.      Neftaly Flores RN    
Reason for Call:  Medication request    Do you use a [facility]?  Name of the pharmacy and phone number for the current request:  [facility] - [phone]    Name of the medication requested: ZOFRAN 4 MG     Can we leave a detailed message on this number? YES    Phone number patient can be reached at: Home number on file [phone] (home)    Best Time: Anytime    Call taken on 6/19/2017 at 10:58 AM by Carleen Garcia      
Routing refill request to provider for review/approval because:  Drug not active on patient's medication list          ondansetron (ZOFRAN ODT) 4 MG disintegrating tablet      Last Written Prescription Date: -  Last Fill Quantity: -,  # refills: -   Last Office Visit with G, P or [facility] prescribing provider: 6/13/17                               Next 5 appointments (look out 90 days)     Jun 23, 2017  2:00 PM CDT   Return Visit with Adela Hyman MD   [facility] ([facility]    [address]  [address]  [facility] [address]   [phone]            Jun 29, 2017  2:30 PM CDT   Return Visit with EFREN Oro   [facility] ([facility])    [address]  [address]  [facility] [address]   [phone]            Jun 29, 2017  2:30 PM CDT   Return Visit with Dagoberto Chua PA-C   [facility] ([facility])    [address]  [address]  [facility] [address]   [phone]                    Neftaly Flores RN    
ambulatory

## 2022-10-17 ENCOUNTER — TRANSFERRED RECORDS (OUTPATIENT)
Dept: HEALTH INFORMATION MANAGEMENT | Facility: CLINIC | Age: 52
End: 2022-10-17

## 2022-11-14 ENCOUNTER — TRANSFERRED RECORDS (OUTPATIENT)
Dept: HEALTH INFORMATION MANAGEMENT | Facility: CLINIC | Age: 52
End: 2022-11-14